# Patient Record
Sex: FEMALE | Race: WHITE | NOT HISPANIC OR LATINO | Employment: FULL TIME | URBAN - METROPOLITAN AREA
[De-identification: names, ages, dates, MRNs, and addresses within clinical notes are randomized per-mention and may not be internally consistent; named-entity substitution may affect disease eponyms.]

---

## 2019-11-04 ENCOUNTER — HOSPITAL ENCOUNTER (EMERGENCY)
Facility: HOSPITAL | Age: 44
Discharge: HOME | End: 2019-11-04
Attending: EMERGENCY MEDICINE | Admitting: EMERGENCY MEDICINE

## 2019-11-04 ENCOUNTER — APPOINTMENT (OUTPATIENT)
Dept: CT IMAGING | Facility: HOSPITAL | Age: 44
End: 2019-11-04

## 2019-11-04 VITALS
HEART RATE: 98 BPM | WEIGHT: 215 LBS | BODY MASS INDEX: 36.7 KG/M2 | OXYGEN SATURATION: 100 % | HEIGHT: 64 IN | SYSTOLIC BLOOD PRESSURE: 158 MMHG | DIASTOLIC BLOOD PRESSURE: 88 MMHG | TEMPERATURE: 98.6 F | RESPIRATION RATE: 16 BRPM

## 2019-11-04 DIAGNOSIS — M51.36 DEGENERATIVE DISC DISEASE, LUMBAR: ICD-10-CM

## 2019-11-04 DIAGNOSIS — M53.86 SCIATICA ASSOCIATED WITH DISORDER OF LUMBAR SPINE: ICD-10-CM

## 2019-11-04 DIAGNOSIS — M54.16 LUMBAR RADICULOPATHY: Primary | ICD-10-CM

## 2019-11-04 PROCEDURE — 63710000001 PREDNISONE PER 1 MG: Performed by: PHYSICIAN ASSISTANT

## 2019-11-04 PROCEDURE — 99283 EMERGENCY DEPT VISIT LOW MDM: CPT

## 2019-11-04 PROCEDURE — 72131 CT LUMBAR SPINE W/O DYE: CPT

## 2019-11-04 RX ORDER — HYDROCODONE BITARTRATE AND ACETAMINOPHEN 7.5; 325 MG/1; MG/1
1 TABLET ORAL EVERY 6 HOURS PRN
Qty: 12 TABLET | Refills: 0 | Status: SHIPPED | OUTPATIENT
Start: 2019-11-04 | End: 2019-11-13

## 2019-11-04 RX ORDER — LIDOCAINE 50 MG/G
1 PATCH TOPICAL EVERY 24 HOURS
Qty: 30 PATCH | Refills: 0 | Status: SHIPPED | OUTPATIENT
Start: 2019-11-04

## 2019-11-04 RX ORDER — ORPHENADRINE CITRATE 100 MG/1
100 TABLET, EXTENDED RELEASE ORAL 2 TIMES DAILY
Qty: 14 TABLET | Refills: 0 | Status: SHIPPED | OUTPATIENT
Start: 2019-11-04 | End: 2019-11-13

## 2019-11-04 RX ORDER — GABAPENTIN 600 MG/1
600 TABLET ORAL 3 TIMES DAILY PRN
COMMUNITY

## 2019-11-04 RX ORDER — PREDNISONE 20 MG/1
60 TABLET ORAL ONCE
Status: COMPLETED | OUTPATIENT
Start: 2019-11-04 | End: 2019-11-04

## 2019-11-04 RX ORDER — ORPHENADRINE CITRATE 100 MG/1
100 TABLET, EXTENDED RELEASE ORAL EVERY 12 HOURS SCHEDULED
Status: DISCONTINUED | OUTPATIENT
Start: 2019-11-04 | End: 2019-11-04 | Stop reason: HOSPADM

## 2019-11-04 RX ORDER — PREDNISONE 20 MG/1
20 TABLET ORAL 2 TIMES DAILY
Qty: 10 TABLET | Refills: 0 | Status: SHIPPED | OUTPATIENT
Start: 2019-11-04 | End: 2019-11-09

## 2019-11-04 RX ORDER — HYDROCODONE BITARTRATE AND ACETAMINOPHEN 7.5; 325 MG/1; MG/1
1 TABLET ORAL ONCE
Status: COMPLETED | OUTPATIENT
Start: 2019-11-04 | End: 2019-11-04

## 2019-11-04 RX ADMIN — ORPHENADRINE CITRATE 100 MG: 100 TABLET, EXTENDED RELEASE ORAL at 12:41

## 2019-11-04 RX ADMIN — HYDROCODONE BITARTRATE AND ACETAMINOPHEN 1 TABLET: 7.5; 325 TABLET ORAL at 12:23

## 2019-11-04 RX ADMIN — PREDNISONE 60 MG: 20 TABLET ORAL at 12:23

## 2019-11-13 ENCOUNTER — OFFICE VISIT (OUTPATIENT)
Dept: NEUROSURGERY | Facility: CLINIC | Age: 44
End: 2019-11-13

## 2019-11-13 VITALS
WEIGHT: 211 LBS | SYSTOLIC BLOOD PRESSURE: 142 MMHG | BODY MASS INDEX: 36.02 KG/M2 | TEMPERATURE: 99 F | DIASTOLIC BLOOD PRESSURE: 100 MMHG | HEIGHT: 64 IN

## 2019-11-13 DIAGNOSIS — M54.41 ACUTE RIGHT-SIDED LOW BACK PAIN WITH RIGHT-SIDED SCIATICA: Primary | ICD-10-CM

## 2019-11-13 PROCEDURE — 99213 OFFICE O/P EST LOW 20 MIN: CPT | Performed by: PHYSICIAN ASSISTANT

## 2019-11-13 RX ORDER — LISDEXAMFETAMINE DIMESYLATE 70 MG/1
CAPSULE ORAL
COMMUNITY
Start: 2019-11-12

## 2019-11-13 RX ORDER — MELOXICAM 15 MG/1
15 TABLET ORAL DAILY
Qty: 30 TABLET | Refills: 1 | Status: SHIPPED | OUTPATIENT
Start: 2019-11-13

## 2020-01-31 ENCOUNTER — TELEPHONE (OUTPATIENT)
Dept: NEUROSURGERY | Facility: CLINIC | Age: 45
End: 2020-01-31

## 2024-01-25 ENCOUNTER — OFFICE VISIT (OUTPATIENT)
Dept: ORTHOPEDIC SURGERY | Facility: CLINIC | Age: 49
End: 2024-01-25
Payer: MEDICAID

## 2024-01-25 VITALS
SYSTOLIC BLOOD PRESSURE: 130 MMHG | HEIGHT: 64 IN | WEIGHT: 241 LBS | DIASTOLIC BLOOD PRESSURE: 80 MMHG | BODY MASS INDEX: 41.15 KG/M2

## 2024-01-25 DIAGNOSIS — M17.12 PRIMARY OSTEOARTHRITIS OF LEFT KNEE: Primary | ICD-10-CM

## 2024-01-25 DIAGNOSIS — M25.562 LEFT KNEE PAIN, UNSPECIFIED CHRONICITY: ICD-10-CM

## 2024-01-25 DIAGNOSIS — M25.462 EFFUSION OF LEFT KNEE: ICD-10-CM

## 2024-01-25 PROBLEM — M54.42 CHRONIC BILATERAL LOW BACK PAIN WITH BILATERAL SCIATICA: Status: ACTIVE | Noted: 2021-06-14

## 2024-01-25 PROBLEM — I10 HTN (HYPERTENSION): Status: ACTIVE | Noted: 2019-12-30

## 2024-01-25 PROBLEM — M16.12 PRIMARY OSTEOARTHRITIS OF LEFT HIP: Status: ACTIVE | Noted: 2020-01-14

## 2024-01-25 PROBLEM — G89.29 CHRONIC BILATERAL LOW BACK PAIN WITH BILATERAL SCIATICA: Status: ACTIVE | Noted: 2021-06-14

## 2024-01-25 PROBLEM — M54.41 CHRONIC BILATERAL LOW BACK PAIN WITH BILATERAL SCIATICA: Status: ACTIVE | Noted: 2021-06-14

## 2024-01-25 PROBLEM — E78.5 HYPERLIPIDEMIA: Status: ACTIVE | Noted: 2018-08-29

## 2024-01-25 PROBLEM — N93.9 ABNORMAL UTERINE BLEEDING (AUB): Status: ACTIVE | Noted: 2017-12-20

## 2024-01-25 PROBLEM — E66.9 OBESITY: Status: ACTIVE | Noted: 2017-02-15

## 2024-01-25 PROBLEM — F17.210 NICOTINE DEPENDENCE, CIGARETTES, UNCOMPLICATED: Status: ACTIVE | Noted: 2021-07-23

## 2024-01-25 PROBLEM — M54.50 LOW BACK PAIN: Status: ACTIVE | Noted: 2020-02-06

## 2024-01-25 RX ORDER — TRIAMCINOLONE ACETONIDE 40 MG/ML
2 INJECTION, SUSPENSION INTRA-ARTICULAR; INTRAMUSCULAR
Status: COMPLETED | OUTPATIENT
Start: 2024-01-25 | End: 2024-01-25

## 2024-01-25 RX ORDER — LAMOTRIGINE 100 MG/1
TABLET ORAL
COMMUNITY
Start: 2024-01-11

## 2024-01-25 RX ORDER — BUPIVACAINE HYDROCHLORIDE 5 MG/ML
1 INJECTION, SOLUTION EPIDURAL; INTRACAUDAL
Status: COMPLETED | OUTPATIENT
Start: 2024-01-25 | End: 2024-01-25

## 2024-01-25 RX ORDER — LIDOCAINE HYDROCHLORIDE 10 MG/ML
3 INJECTION, SOLUTION EPIDURAL; INFILTRATION; INTRACAUDAL; PERINEURAL
Status: COMPLETED | OUTPATIENT
Start: 2024-01-25 | End: 2024-01-25

## 2024-01-25 RX ORDER — CARIPRAZINE 1.5 MG/1
CAPSULE, GELATIN COATED ORAL
COMMUNITY
Start: 2024-01-09

## 2024-01-25 RX ORDER — SERTRALINE HYDROCHLORIDE 100 MG/1
TABLET, FILM COATED ORAL
COMMUNITY
Start: 2023-12-30

## 2024-01-25 RX ADMIN — TRIAMCINOLONE ACETONIDE 2 MG: 40 INJECTION, SUSPENSION INTRA-ARTICULAR; INTRAMUSCULAR at 09:39

## 2024-01-25 RX ADMIN — BUPIVACAINE HYDROCHLORIDE 1 ML: 5 INJECTION, SOLUTION EPIDURAL; INTRACAUDAL at 09:39

## 2024-01-25 RX ADMIN — LIDOCAINE HYDROCHLORIDE 3 ML: 10 INJECTION, SOLUTION EPIDURAL; INFILTRATION; INTRACAUDAL; PERINEURAL at 09:39

## 2024-08-28 ENCOUNTER — OFFICE VISIT (OUTPATIENT)
Dept: FAMILY MEDICINE CLINIC | Facility: CLINIC | Age: 49
End: 2024-08-28
Payer: COMMERCIAL

## 2024-08-28 VITALS
SYSTOLIC BLOOD PRESSURE: 122 MMHG | WEIGHT: 245.2 LBS | HEART RATE: 90 BPM | DIASTOLIC BLOOD PRESSURE: 84 MMHG | BODY MASS INDEX: 41.86 KG/M2 | OXYGEN SATURATION: 99 % | HEIGHT: 64 IN | TEMPERATURE: 98.7 F

## 2024-08-28 DIAGNOSIS — G47.39 SLEEP APNEA-LIKE BEHAVIOR: Primary | ICD-10-CM

## 2024-08-28 DIAGNOSIS — G89.29 CHRONIC MIDLINE LOW BACK PAIN WITHOUT SCIATICA: ICD-10-CM

## 2024-08-28 DIAGNOSIS — E53.8 B12 DEFICIENCY: ICD-10-CM

## 2024-08-28 DIAGNOSIS — M54.50 CHRONIC MIDLINE LOW BACK PAIN WITHOUT SCIATICA: ICD-10-CM

## 2024-08-28 DIAGNOSIS — Z00.00 GENERAL MEDICAL EXAM: ICD-10-CM

## 2024-08-28 DIAGNOSIS — Z12.11 COLON CANCER SCREENING: ICD-10-CM

## 2024-08-28 DIAGNOSIS — Z12.31 BREAST CANCER SCREENING BY MAMMOGRAM: ICD-10-CM

## 2024-08-28 PROCEDURE — 99386 PREV VISIT NEW AGE 40-64: CPT | Performed by: PHYSICIAN ASSISTANT

## 2024-08-30 ENCOUNTER — PATIENT ROUNDING (BHMG ONLY) (OUTPATIENT)
Dept: FAMILY MEDICINE CLINIC | Facility: CLINIC | Age: 49
End: 2024-08-30
Payer: COMMERCIAL

## 2024-08-30 RX ORDER — ATORVASTATIN CALCIUM 40 MG/1
40 TABLET, FILM COATED ORAL DAILY
Qty: 90 TABLET | Refills: 3 | Status: SHIPPED | OUTPATIENT
Start: 2024-08-30

## 2024-10-02 ENCOUNTER — OFFICE VISIT (OUTPATIENT)
Dept: PAIN MEDICINE | Facility: CLINIC | Age: 49
End: 2024-10-02
Payer: COMMERCIAL

## 2024-10-02 VITALS — WEIGHT: 244 LBS | HEIGHT: 64 IN | BODY MASS INDEX: 41.66 KG/M2

## 2024-10-02 DIAGNOSIS — M47.817 LUMBOSACRAL SPONDYLOSIS WITHOUT MYELOPATHY: Primary | ICD-10-CM

## 2024-10-02 PROCEDURE — 99203 OFFICE O/P NEW LOW 30 MIN: CPT | Performed by: STUDENT IN AN ORGANIZED HEALTH CARE EDUCATION/TRAINING PROGRAM

## 2024-10-02 RX ORDER — GABAPENTIN 100 MG/1
100 CAPSULE ORAL 2 TIMES DAILY
Qty: 60 CAPSULE | Refills: 0 | Status: SHIPPED | OUTPATIENT
Start: 2024-10-02

## 2024-10-05 ENCOUNTER — LAB (OUTPATIENT)
Dept: LAB | Facility: HOSPITAL | Age: 49
End: 2024-10-05
Payer: COMMERCIAL

## 2024-10-05 DIAGNOSIS — E53.8 B12 DEFICIENCY: ICD-10-CM

## 2024-10-05 DIAGNOSIS — Z00.00 GENERAL MEDICAL EXAM: ICD-10-CM

## 2024-10-05 LAB
ALBUMIN SERPL-MCNC: 4 G/DL (ref 3.5–5.2)
ALBUMIN/GLOB SERPL: 1.5 G/DL
ALP SERPL-CCNC: 103 U/L (ref 39–117)
ALT SERPL W P-5'-P-CCNC: 16 U/L (ref 1–33)
ANION GAP SERPL CALCULATED.3IONS-SCNC: 11.8 MMOL/L (ref 5–15)
AST SERPL-CCNC: 16 U/L (ref 1–32)
BILIRUB SERPL-MCNC: 0.5 MG/DL (ref 0–1.2)
BUN SERPL-MCNC: 16 MG/DL (ref 6–20)
BUN/CREAT SERPL: 13.1 (ref 7–25)
CALCIUM SPEC-SCNC: 9.3 MG/DL (ref 8.6–10.5)
CHLORIDE SERPL-SCNC: 104 MMOL/L (ref 98–107)
CHOLEST SERPL-MCNC: 108 MG/DL (ref 0–200)
CO2 SERPL-SCNC: 25.2 MMOL/L (ref 22–29)
CREAT SERPL-MCNC: 1.22 MG/DL (ref 0.57–1)
DEPRECATED RDW RBC AUTO: 46.6 FL (ref 37–54)
EGFRCR SERPLBLD CKD-EPI 2021: 54.5 ML/MIN/1.73
ERYTHROCYTE [DISTWIDTH] IN BLOOD BY AUTOMATED COUNT: 13.2 % (ref 12.3–15.4)
FOLATE SERPL-MCNC: 6.09 NG/ML (ref 4.78–24.2)
GLOBULIN UR ELPH-MCNC: 2.7 GM/DL
GLUCOSE SERPL-MCNC: 98 MG/DL (ref 65–99)
HCT VFR BLD AUTO: 50.4 % (ref 34–46.6)
HCV AB SER QL: NORMAL
HDLC SERPL-MCNC: 40 MG/DL (ref 40–60)
HGB BLD-MCNC: 15.8 G/DL (ref 12–15.9)
LDLC SERPL CALC-MCNC: 52 MG/DL (ref 0–100)
LDLC/HDLC SERPL: 1.32 {RATIO}
MCH RBC QN AUTO: 29.6 PG (ref 26.6–33)
MCHC RBC AUTO-ENTMCNC: 31.3 G/DL (ref 31.5–35.7)
MCV RBC AUTO: 94.4 FL (ref 79–97)
PLATELET # BLD AUTO: 282 10*3/MM3 (ref 140–450)
PMV BLD AUTO: 12.3 FL (ref 6–12)
POTASSIUM SERPL-SCNC: 4.5 MMOL/L (ref 3.5–5.2)
PROT SERPL-MCNC: 6.7 G/DL (ref 6–8.5)
RBC # BLD AUTO: 5.34 10*6/MM3 (ref 3.77–5.28)
SODIUM SERPL-SCNC: 141 MMOL/L (ref 136–145)
TRIGL SERPL-MCNC: 76 MG/DL (ref 0–150)
TSH SERPL DL<=0.05 MIU/L-ACNC: 0.89 UIU/ML (ref 0.27–4.2)
VIT B12 BLD-MCNC: 255 PG/ML (ref 211–946)
VLDLC SERPL-MCNC: 16 MG/DL (ref 5–40)
WBC NRBC COR # BLD AUTO: 7.31 10*3/MM3 (ref 3.4–10.8)

## 2024-10-05 PROCEDURE — 86803 HEPATITIS C AB TEST: CPT

## 2024-10-05 PROCEDURE — 82607 VITAMIN B-12: CPT

## 2024-10-05 PROCEDURE — 82746 ASSAY OF FOLIC ACID SERUM: CPT

## 2024-10-05 PROCEDURE — 80061 LIPID PANEL: CPT

## 2024-10-05 PROCEDURE — 36415 COLL VENOUS BLD VENIPUNCTURE: CPT

## 2024-10-05 PROCEDURE — 80050 GENERAL HEALTH PANEL: CPT

## 2024-10-07 RX ORDER — FOLIC ACID 1 MG/1
1 TABLET ORAL DAILY
Qty: 30 TABLET | Refills: 5 | Status: SHIPPED | OUTPATIENT
Start: 2024-10-07

## 2024-10-07 RX ORDER — NEEDLES, SAFETY 18GX1 1/2"
1 NEEDLE, DISPOSABLE MISCELLANEOUS WEEKLY
Qty: 8 EACH | Refills: 3 | Status: SHIPPED | OUTPATIENT
Start: 2024-10-07

## 2024-10-07 RX ORDER — CYANOCOBALAMIN 1000 UG/ML
1000 INJECTION, SOLUTION INTRAMUSCULAR; SUBCUTANEOUS WEEKLY
Qty: 4 ML | Refills: 3 | Status: SHIPPED | OUTPATIENT
Start: 2024-10-07

## 2024-10-11 RX ORDER — MELOXICAM 15 MG/1
15 TABLET ORAL DAILY
Qty: 90 TABLET | Refills: 1 | Status: SHIPPED | OUTPATIENT
Start: 2024-10-11

## 2024-11-22 ENCOUNTER — OFFICE VISIT (OUTPATIENT)
Dept: SLEEP MEDICINE | Facility: CLINIC | Age: 49
End: 2024-11-22
Payer: COMMERCIAL

## 2024-11-22 VITALS
TEMPERATURE: 98.2 F | BODY MASS INDEX: 40.8 KG/M2 | HEART RATE: 126 BPM | WEIGHT: 239 LBS | SYSTOLIC BLOOD PRESSURE: 138 MMHG | HEIGHT: 64 IN | OXYGEN SATURATION: 97 % | DIASTOLIC BLOOD PRESSURE: 84 MMHG

## 2024-11-22 DIAGNOSIS — I10 PRIMARY HYPERTENSION: Primary | ICD-10-CM

## 2024-11-22 DIAGNOSIS — E66.01 CLASS 3 SEVERE OBESITY DUE TO EXCESS CALORIES WITH BODY MASS INDEX (BMI) OF 50.0 TO 59.9 IN ADULT, UNSPECIFIED WHETHER SERIOUS COMORBIDITY PRESENT: ICD-10-CM

## 2024-11-22 DIAGNOSIS — E66.813 CLASS 3 SEVERE OBESITY DUE TO EXCESS CALORIES WITH BODY MASS INDEX (BMI) OF 50.0 TO 59.9 IN ADULT, UNSPECIFIED WHETHER SERIOUS COMORBIDITY PRESENT: ICD-10-CM

## 2024-11-22 DIAGNOSIS — R06.83 SNORING: ICD-10-CM

## 2024-11-22 DIAGNOSIS — G47.19 EXCESSIVE DAYTIME SLEEPINESS: ICD-10-CM

## 2024-11-22 NOTE — PROGRESS NOTES
"Chief Complaint:   Chief Complaint   Patient presents with    Sleeping Problem    Snoring       HPI:    Hanane Torres is a 48 y.o. female here to establish care.  Patient sees Ms. Blues ISBELL for primary care.  Patient has a 1 to 2-year history of snoring, excessive daytime sleepiness teeth grinding and leg cramps patient states this has been worsening lately..  Patient states \"I am sleeping too much and always feel tired.  No history of nasal fracture or head injury.  She is here today to see if sleep apnea is playing a part in any previous diagnoses or current symptoms.    Patient goes to bed the same time weekend and weekday usually 6 PM to 9 PM and getting up at 6 AM throughout the week.  She gets 10 to 12 hours of sleep nightly and is not rested upon awakening.  She goes to sleep quickly and is up and down at least 3-4 times at night.  She does take a daily 2 to 3-hour nap.    Sleep apnea risk factors:  Patient has symptoms Snoring, Restless sleep, Daytime sleepiness, and Frequent arousal from sleep  suggestive of the presence of obstructive sleep apnea. The patient has the following co-morbid conditions of HTN and morbidly obese (BMI > 40 or > 35 with obesity - related health condition). We did speak today about the consequences of untreated sleep apnea worsening the previously listed co-morbidities and sudden cardiac death.  We also discussed different therapies available to him such as CPAP, MAD, or ENT referral.  Patient verbalizes understanding.    Social history  This very pleasant 48-year-old female.  Patient is a medical assistant for [employer].  Patient is a non-smoker and nondrinker.  Patient will have 1 cup of regular coffee daily and 2 sodas.       Family History   Problem Relation Age of Onset    Kidney disease Mother     Diabetes Mother     Arthritis Mother     Obesity Mother     COPD Mother     Hyperlipidemia Father     Obesity Sister     Diabetes Sister     Arthritis Sister     Hypertension " "Sister      Past Surgical History:   Procedure Laterality Date    CARPAL TUNNEL RELEASE       SECTION      CHOLECYSTECTOMY      GALLBLADDER SURGERY      HIP ARTHROPLASTY Right 2020    JOINT REPLACEMENT  [id number]    JUAN JOSE right    ORTHOPEDIC SURGERY               Current medications are:   Current Outpatient Medications:     amphetamine-dextroamphetamine XR (Adderall XR) 20 MG 24 hr capsule, Take 1 capsule by mouth 2 (Two) Times a Day, Disp: 60 capsule, Rfl: 0    atorvastatin (Lipitor) 40 MG tablet, Take 1 tablet by mouth Daily., Disp: 90 tablet, Rfl: 3    Cariprazine HCl (Vraylar) 4.5 MG capsule capsule, Take 1 capsule by mouth Daily., Disp: 30 capsule, Rfl: 0    FLUoxetine (PROzac) 40 MG capsule, Take 1 capsule by mouth Daily., Disp: 30 capsule, Rfl: 0    folic acid (FOLVITE) 1 MG tablet, Take 1 tablet by mouth Daily., Disp: 30 tablet, Rfl: 5    lisinopril (PRINIVIL,ZESTRIL) 20 MG tablet, Take 1 tablet by mouth Daily., Disp: 30 tablet, Rfl: 11    meloxicam (MOBIC) 15 MG tablet, Take 1 tablet by mouth Daily with Food., Disp: 90 tablet, Rfl: 1    norethindrone (AYGESTIN) 5 MG tablet, Take 2 tablets by mouth Daily., Disp: 180 tablet, Rfl: 2    Syringe/Needle, Disp, (BD Eclipse Syringe) 25G X 1\" 3 ML misc, Use 1 syringe 1 (One) Time Per Week with B-12 injection., Disp: 8 each, Rfl: 3    cephalexin (KEFLEX) 500 MG capsule, Take 4 capsules by mouth 1 hour before dental appointment (Patient not taking: Reported on 2024), Disp: 4 capsule, Rfl: 10    cyanocobalamin 1000 MCG/ML injection, Inject 1 mL into the appropriate muscle as directed by prescriber 1 (One) Time Per Week for 8 weeks, then 1 ml per month thereafter (Patient not taking: Reported on 2024), Disp: 4 mL, Rfl: 3.      The patient's relevant past medical, surgical, family and social history were reviewed and updated in Epic as appropriate.       Review of Systems   Constitutional:  Positive for fatigue.   HENT:  Positive for drooling.    Eyes:  " "Positive for visual disturbance.   Musculoskeletal:  Positive for back pain, joint swelling, myalgias, neck pain and neck stiffness.   Allergic/Immunologic: Positive for environmental allergies.   Psychiatric/Behavioral:  Positive for agitation, decreased concentration, dysphoric mood and sleep disturbance. The patient is nervous/anxious.    All other systems reviewed and are negative.        Objective:    Physical Exam  Constitutional:       Appearance: Normal appearance.   HENT:      Head: Normocephalic and atraumatic.      Mouth/Throat:      Mouth: Mucous membranes are moist.      Pharynx: Oropharynx is clear.      Comments: Class 4 airway  Cardiovascular:      Rate and Rhythm: Normal rate and regular rhythm. Tachycardia present.   Pulmonary:      Effort: Pulmonary effort is normal.      Breath sounds: Normal breath sounds.   Skin:     General: Skin is warm and dry.   Neurological:      Mental Status: She is alert and oriented to person, place, and time.   Psychiatric:         Mood and Affect: Mood normal.         Behavior: Behavior normal.         Thought Content: Thought content normal.         Judgment: Judgment normal.         /84   Pulse (!) 126   Temp 98.2 °F (36.8 °C)   Ht 162.6 cm (64.02\")   Wt 108 kg (239 lb)   SpO2 97%   BMI 41.00 kg/m²     ASSESSMENT/PLAN    Diagnoses and all orders for this visit:    1. Primary hypertension (Primary)  -     Home Sleep Study; Future    2. Excessive daytime sleepiness  -     Home Sleep Study; Future    3. Snoring  -     Home Sleep Study; Future    4. Class 3 severe obesity due to excess calories with body mass index (BMI) of 50.0 to 59.9 in adult, unspecified whether serious comorbidity present  -     Home Sleep Study; Future      Certainly has a strong story for sleep apnea due to the consequences of untreated sleep apnea will move forward with HST and follow-up as appropriate.    Thank you for this kind referral.      Signed by  Hanane Trevizo, " APRN    November 22, 2024      CC: Keren Mackey, Keren Pierre PA-C

## 2025-01-24 ENCOUNTER — HOSPITAL ENCOUNTER (OUTPATIENT)
Dept: MAMMOGRAPHY | Facility: HOSPITAL | Age: 50
Discharge: HOME | End: 2025-01-24
Admitting: PHYSICIAN ASSISTANT
Payer: COMMERCIAL

## 2025-01-24 DIAGNOSIS — Z12.31 BREAST CANCER SCREENING BY MAMMOGRAM: ICD-10-CM

## 2025-01-24 PROCEDURE — 77063 BREAST TOMOSYNTHESIS BI: CPT

## 2025-01-24 PROCEDURE — 77067 SCR MAMMO BI INCL CAD: CPT

## 2025-02-03 ENCOUNTER — HOSPITAL ENCOUNTER (OUTPATIENT)
Dept: SLEEP MEDICINE | Facility: HOSPITAL | Age: 50
Discharge: HOME | End: 2025-02-03
Admitting: NURSE PRACTITIONER
Payer: COMMERCIAL

## 2025-02-03 VITALS — HEIGHT: 64 IN | BODY MASS INDEX: 40.65 KG/M2 | WEIGHT: 238.1 LBS

## 2025-02-03 DIAGNOSIS — E66.01 CLASS 3 SEVERE OBESITY DUE TO EXCESS CALORIES WITH BODY MASS INDEX (BMI) OF 50.0 TO 59.9 IN ADULT, UNSPECIFIED WHETHER SERIOUS COMORBIDITY PRESENT: ICD-10-CM

## 2025-02-03 DIAGNOSIS — E66.813 CLASS 3 SEVERE OBESITY DUE TO EXCESS CALORIES WITH BODY MASS INDEX (BMI) OF 50.0 TO 59.9 IN ADULT, UNSPECIFIED WHETHER SERIOUS COMORBIDITY PRESENT: ICD-10-CM

## 2025-02-03 DIAGNOSIS — R06.83 SNORING: ICD-10-CM

## 2025-02-03 DIAGNOSIS — I10 PRIMARY HYPERTENSION: ICD-10-CM

## 2025-02-03 DIAGNOSIS — G47.19 EXCESSIVE DAYTIME SLEEPINESS: ICD-10-CM

## 2025-02-03 PROCEDURE — G0399 HOME SLEEP TEST/TYPE 3 PORTA: HCPCS

## 2025-02-05 ENCOUNTER — TRANSCRIBE ORDERS (OUTPATIENT)
Dept: MAMMOGRAPHY | Facility: HOSPITAL | Age: 50
End: 2025-02-05
Payer: COMMERCIAL

## 2025-02-05 ENCOUNTER — HOSPITAL ENCOUNTER (OUTPATIENT)
Dept: MAMMOGRAPHY | Facility: HOSPITAL | Age: 50
Discharge: HOME | End: 2025-02-05
Admitting: RADIOLOGY
Payer: COMMERCIAL

## 2025-02-05 DIAGNOSIS — R92.8 ABNORMAL MAMMOGRAM: Primary | ICD-10-CM

## 2025-02-05 DIAGNOSIS — R92.8 ABNORMAL MAMMOGRAM: ICD-10-CM

## 2025-02-05 PROCEDURE — 77061 BREAST TOMOSYNTHESIS UNI: CPT | Performed by: RADIOLOGY

## 2025-02-05 PROCEDURE — G0279 TOMOSYNTHESIS, MAMMO: HCPCS

## 2025-02-05 PROCEDURE — 77065 DX MAMMO INCL CAD UNI: CPT

## 2025-02-05 PROCEDURE — 77065 DX MAMMO INCL CAD UNI: CPT | Performed by: RADIOLOGY

## 2025-02-06 DIAGNOSIS — I10 ESSENTIAL HYPERTENSION: Primary | ICD-10-CM

## 2025-02-06 DIAGNOSIS — E66.813 CLASS 3 SEVERE OBESITY DUE TO EXCESS CALORIES WITHOUT SERIOUS COMORBIDITY WITH BODY MASS INDEX (BMI) OF 50.0 TO 59.9 IN ADULT: ICD-10-CM

## 2025-02-06 DIAGNOSIS — G47.19 EXCESSIVE DAYTIME SLEEPINESS: ICD-10-CM

## 2025-02-06 DIAGNOSIS — E66.01 CLASS 3 SEVERE OBESITY DUE TO EXCESS CALORIES WITHOUT SERIOUS COMORBIDITY WITH BODY MASS INDEX (BMI) OF 50.0 TO 59.9 IN ADULT: ICD-10-CM

## 2025-02-06 DIAGNOSIS — R06.83 SNORING: ICD-10-CM

## 2025-02-12 RX ORDER — LISINOPRIL 20 MG/1
20 TABLET ORAL DAILY
Qty: 30 TABLET | Refills: 6 | Status: SHIPPED | OUTPATIENT
Start: 2025-02-12

## 2025-02-12 NOTE — TELEPHONE ENCOUNTER
I am out of refills on my lisinipril. Will you please send a prescription to [facility].      Thank you  Rhea

## 2025-02-13 ENCOUNTER — HOSPITAL ENCOUNTER (OUTPATIENT)
Dept: MAMMOGRAPHY | Facility: HOSPITAL | Age: 50
Discharge: HOME | End: 2025-02-13
Payer: COMMERCIAL

## 2025-02-13 DIAGNOSIS — R92.8 ABNORMAL MAMMOGRAM: ICD-10-CM

## 2025-02-13 PROCEDURE — 25010000002 LIDOCAINE 1 % SOLUTION: Performed by: RADIOLOGY

## 2025-02-13 PROCEDURE — C1819 TISSUE LOCALIZATION-EXCISION: HCPCS

## 2025-02-13 PROCEDURE — 25010000002 LIDOCAINE 1% - EPINEPHRINE 1:100000 1 %-1:100000 SOLUTION: Performed by: RADIOLOGY

## 2025-02-13 RX ORDER — LIDOCAINE HYDROCHLORIDE 10 MG/ML
5 INJECTION, SOLUTION INFILTRATION; PERINEURAL ONCE
Status: COMPLETED | OUTPATIENT
Start: 2025-02-13 | End: 2025-02-13

## 2025-02-13 RX ORDER — LIDOCAINE HYDROCHLORIDE AND EPINEPHRINE 10; 10 MG/ML; UG/ML
10 INJECTION, SOLUTION INFILTRATION; PERINEURAL ONCE
Status: COMPLETED | OUTPATIENT
Start: 2025-02-13 | End: 2025-02-13

## 2025-02-13 RX ADMIN — LIDOCAINE HYDROCHLORIDE,EPINEPHRINE BITARTRATE 10 ML: 10; .01 INJECTION, SOLUTION INFILTRATION; PERINEURAL at 14:23

## 2025-02-13 RX ADMIN — Medication 5 ML: at 14:23

## 2025-02-17 ENCOUNTER — TELEPHONE (OUTPATIENT)
Dept: MAMMOGRAPHY | Facility: HOSPITAL | Age: 50
End: 2025-02-17
Payer: COMMERCIAL

## 2025-02-17 ENCOUNTER — TRANSCRIBE ORDERS (OUTPATIENT)
Dept: ADMINISTRATIVE | Facility: HOSPITAL | Age: 50
End: 2025-02-17
Payer: COMMERCIAL

## 2025-02-17 DIAGNOSIS — R92.8 ABNORMAL MAMMOGRAM: Primary | ICD-10-CM

## 2025-02-17 LAB
CYTO UR: NORMAL
LAB AP CASE REPORT: NORMAL
LAB AP CLINICAL INFORMATION: NORMAL
LAB AP DIAGNOSIS COMMENT: NORMAL
PATH REPORT.FINAL DX SPEC: NORMAL
PATH REPORT.GROSS SPEC: NORMAL

## 2025-02-18 ENCOUNTER — TELEPHONE (OUTPATIENT)
Dept: MAMMOGRAPHY | Facility: HOSPITAL | Age: 50
End: 2025-02-18
Payer: COMMERCIAL

## 2025-02-18 NOTE — TELEPHONE ENCOUNTER
Late entry    On 2/17/25 at 14:08, patient notified of pathology results and recommendation. Verbalizes understanding. States having some discomfort, using analgesics with relief. Denies signs and symptoms of infection. Questions answered, support given, verbalized understanding. Pt aware she will be contacted to schedule follow up.

## 2025-02-26 ENCOUNTER — LAB (OUTPATIENT)
Facility: HOSPITAL | Age: 50
End: 2025-02-26
Payer: COMMERCIAL

## 2025-02-26 ENCOUNTER — TRANSCRIBE ORDERS (OUTPATIENT)
Facility: HOSPITAL | Age: 50
End: 2025-02-26
Payer: COMMERCIAL

## 2025-02-26 DIAGNOSIS — F41.1 GENERALIZED ANXIETY DISORDER: ICD-10-CM

## 2025-02-26 DIAGNOSIS — F31.9 BIPOLAR AFFECTIVE DISORDER, REMISSION STATUS UNSPECIFIED: ICD-10-CM

## 2025-02-26 DIAGNOSIS — F90.0 ADHD, PREDOMINANTLY INATTENTIVE TYPE: Primary | ICD-10-CM

## 2025-02-26 DIAGNOSIS — F90.0 ADHD, PREDOMINANTLY INATTENTIVE TYPE: ICD-10-CM

## 2025-02-26 DIAGNOSIS — F39 MILD MOOD DISORDER: ICD-10-CM

## 2025-02-26 LAB
25(OH)D3 SERPL-MCNC: 18.2 NG/ML (ref 30–100)
AMPHET+METHAMPHET UR QL: POSITIVE
AMPHETAMINES UR QL: NEGATIVE
BARBITURATES UR QL SCN: NEGATIVE
BASOPHILS # BLD AUTO: 0.01 10*3/MM3 (ref 0–0.2)
BASOPHILS NFR BLD AUTO: 0.1 % (ref 0–1.5)
BENZODIAZ UR QL SCN: POSITIVE
BUPRENORPHINE SERPL-MCNC: NEGATIVE NG/ML
CANNABINOIDS SERPL QL: NEGATIVE
COCAINE UR QL: NEGATIVE
DEPRECATED RDW RBC AUTO: 43 FL (ref 37–54)
EOSINOPHIL # BLD AUTO: 0.15 10*3/MM3 (ref 0–0.4)
EOSINOPHIL NFR BLD AUTO: 1.7 % (ref 0.3–6.2)
ERYTHROCYTE [DISTWIDTH] IN BLOOD BY AUTOMATED COUNT: 13.2 % (ref 12.3–15.4)
FENTANYL UR-MCNC: NEGATIVE NG/ML
HCT VFR BLD AUTO: 49.2 % (ref 34–46.6)
HGB BLD-MCNC: 16.5 G/DL (ref 12–15.9)
IMM GRANULOCYTES # BLD AUTO: 0.02 10*3/MM3 (ref 0–0.05)
IMM GRANULOCYTES NFR BLD AUTO: 0.2 % (ref 0–0.5)
LYMPHOCYTES # BLD AUTO: 2.05 10*3/MM3 (ref 0.7–3.1)
LYMPHOCYTES NFR BLD AUTO: 23 % (ref 19.6–45.3)
MCH RBC QN AUTO: 29.4 PG (ref 26.6–33)
MCHC RBC AUTO-ENTMCNC: 33.5 G/DL (ref 31.5–35.7)
MCV RBC AUTO: 87.5 FL (ref 79–97)
METHADONE UR QL SCN: NEGATIVE
MONOCYTES # BLD AUTO: 0.81 10*3/MM3 (ref 0.1–0.9)
MONOCYTES NFR BLD AUTO: 9.1 % (ref 5–12)
NEUTROPHILS NFR BLD AUTO: 5.88 10*3/MM3 (ref 1.7–7)
NEUTROPHILS NFR BLD AUTO: 65.9 % (ref 42.7–76)
OPIATES UR QL: NEGATIVE
OXYCODONE UR QL SCN: NEGATIVE
PCP UR QL SCN: NEGATIVE
PLATELET # BLD AUTO: 233 10*3/MM3 (ref 140–450)
PMV BLD AUTO: 12.1 FL (ref 6–12)
RBC # BLD AUTO: 5.62 10*6/MM3 (ref 3.77–5.28)
TRICYCLICS UR QL SCN: NEGATIVE
WBC NRBC COR # BLD AUTO: 8.92 10*3/MM3 (ref 3.4–10.8)

## 2025-02-26 PROCEDURE — 80050 GENERAL HEALTH PANEL: CPT

## 2025-02-26 PROCEDURE — 80061 LIPID PANEL: CPT

## 2025-02-26 PROCEDURE — 82306 VITAMIN D 25 HYDROXY: CPT

## 2025-02-26 PROCEDURE — 36415 COLL VENOUS BLD VENIPUNCTURE: CPT

## 2025-02-26 PROCEDURE — 80307 DRUG TEST PRSMV CHEM ANLYZR: CPT

## 2025-02-27 LAB
ALBUMIN SERPL-MCNC: 4.1 G/DL (ref 3.5–5.2)
ALBUMIN/GLOB SERPL: 1.5 G/DL
ALP SERPL-CCNC: 115 U/L (ref 39–117)
ALT SERPL W P-5'-P-CCNC: 30 U/L (ref 1–33)
ANION GAP SERPL CALCULATED.3IONS-SCNC: 13 MMOL/L (ref 5–15)
AST SERPL-CCNC: 30 U/L (ref 1–32)
BILIRUB SERPL-MCNC: 1.2 MG/DL (ref 0–1.2)
BUN SERPL-MCNC: 19 MG/DL (ref 6–20)
BUN/CREAT SERPL: 18.1 (ref 7–25)
CALCIUM SPEC-SCNC: 9.4 MG/DL (ref 8.6–10.5)
CHLORIDE SERPL-SCNC: 100 MMOL/L (ref 98–107)
CHOLEST SERPL-MCNC: 119 MG/DL (ref 0–200)
CO2 SERPL-SCNC: 28 MMOL/L (ref 22–29)
CREAT SERPL-MCNC: 1.05 MG/DL (ref 0.57–1)
EGFRCR SERPLBLD CKD-EPI 2021: 64.9 ML/MIN/1.73
GLOBULIN UR ELPH-MCNC: 2.8 GM/DL
GLUCOSE SERPL-MCNC: 89 MG/DL (ref 65–99)
HDLC SERPL-MCNC: 38 MG/DL (ref 40–60)
LDLC SERPL CALC-MCNC: 61 MG/DL (ref 0–100)
LDLC/HDLC SERPL: 1.57 {RATIO}
POTASSIUM SERPL-SCNC: 3.4 MMOL/L (ref 3.5–5.2)
PROT SERPL-MCNC: 6.9 G/DL (ref 6–8.5)
SODIUM SERPL-SCNC: 141 MMOL/L (ref 136–145)
TRIGL SERPL-MCNC: 106 MG/DL (ref 0–150)
VLDLC SERPL-MCNC: 20 MG/DL (ref 5–40)

## 2025-02-28 LAB — TSH SERPL DL<=0.005 MIU/L-ACNC: 2.08 UIU/ML (ref 0.45–4.5)

## 2025-04-02 ENCOUNTER — HOSPITAL ENCOUNTER (OUTPATIENT)
Dept: MAMMOGRAPHY | Facility: HOSPITAL | Age: 50
Discharge: HOME | End: 2025-04-02
Payer: COMMERCIAL

## 2025-04-02 DIAGNOSIS — R92.8 ABNORMAL MAMMOGRAM: ICD-10-CM

## 2025-04-02 PROCEDURE — 88305 TISSUE EXAM BY PATHOLOGIST: CPT | Performed by: PHYSICIAN ASSISTANT

## 2025-04-02 PROCEDURE — 88341 IMHCHEM/IMCYTCHM EA ADD ANTB: CPT | Performed by: PHYSICIAN ASSISTANT

## 2025-04-02 PROCEDURE — 88360 TUMOR IMMUNOHISTOCHEM/MANUAL: CPT | Performed by: PHYSICIAN ASSISTANT

## 2025-04-02 PROCEDURE — C1819 TISSUE LOCALIZATION-EXCISION: HCPCS

## 2025-04-02 PROCEDURE — 25010000002 LIDOCAINE 1% - EPINEPHRINE 1:100000 1 %-1:100000 SOLUTION: Performed by: PHYSICIAN ASSISTANT

## 2025-04-02 PROCEDURE — 25010000002 LIDOCAINE 1 % SOLUTION: Performed by: PHYSICIAN ASSISTANT

## 2025-04-02 RX ORDER — LIDOCAINE HYDROCHLORIDE AND EPINEPHRINE 10; 10 MG/ML; UG/ML
10 INJECTION, SOLUTION INFILTRATION; PERINEURAL ONCE
Status: COMPLETED | OUTPATIENT
Start: 2025-04-02 | End: 2025-04-02

## 2025-04-02 RX ORDER — LIDOCAINE HYDROCHLORIDE 10 MG/ML
5 INJECTION, SOLUTION INFILTRATION; PERINEURAL ONCE
Status: COMPLETED | OUTPATIENT
Start: 2025-04-02 | End: 2025-04-02

## 2025-04-02 RX ADMIN — Medication 5 ML: at 11:39

## 2025-04-02 RX ADMIN — LIDOCAINE HYDROCHLORIDE,EPINEPHRINE BITARTRATE 10 ML: 10; .01 INJECTION, SOLUTION INFILTRATION; PERINEURAL at 11:39

## 2025-04-02 NOTE — PROGRESS NOTES
Alert and oriented. Denies discomfort, no active bleeding, steri-strips not visualized, gauze dressing intact. Cold pack given. Questions answered, support given. Verbalizes and demonstrates understanding of post-care instructions, written copy given.

## 2025-04-04 ENCOUNTER — TELEPHONE (OUTPATIENT)
Dept: MAMMOGRAPHY | Facility: HOSPITAL | Age: 50
End: 2025-04-04
Payer: COMMERCIAL

## 2025-04-04 LAB
CYTO UR: NORMAL
LAB AP CASE REPORT: NORMAL
LAB AP CLINICAL INFORMATION: NORMAL
LAB AP DIAGNOSIS COMMENT: NORMAL
LAB AP SPECIAL STAINS: NORMAL
PATH REPORT.FINAL DX SPEC: NORMAL
PATH REPORT.GROSS SPEC: NORMAL

## 2025-04-04 NOTE — TELEPHONE ENCOUNTER
Referring provider's office notified pathology returned as cancer and patient will be notified.     Patient notified of pathology results and recommendation. Verbalizes understanding. States having some discomfort, denies needing analgesics. . Denies signs and symptoms of infection.     Patient to research surgeon choice.. Patient is to call back with decision; an appointment will then be scheduled and patient will be notified. Verbalizes understanding.

## 2025-04-04 NOTE — TELEPHONE ENCOUNTER
Patient requests Dr ANTHONY Fontaine for surgical consult. Patient will be notified when an appointment is scheduled. Verbalized understanding.

## 2025-04-04 NOTE — TELEPHONE ENCOUNTER
Patient notified of surgical consult appointment with Dr ANTHONY Fontaine on 4/14/25 @ 10:00 with arrival of 9:30 at [facility]. Patient given office contact & location information. Patient told she will receive an information packet from [facility] with detailed location instructions. Patient notified to bring photo ID, insurance information, list of prescription & OTC medications. Patient may be accompanied by family member or friend for support.    Reviewed what would be discussed at surgical consult visit, including detailed explanation of pathology report and imaging reports; treatment options and pros/cons, availability of breast cancer nurse navigator. Patient encouraged to contact breast cancer nurse navigators, surgeon's office with questions or concerns. Breast cancer information packet offered and accepted. Patient verbalized understanding. Patient information sent to breast cancer nurse navigators, genetics pool for evaluation.

## 2025-04-14 ENCOUNTER — PATIENT OUTREACH (OUTPATIENT)
Dept: OTHER | Facility: HOSPITAL | Age: 50
End: 2025-04-14
Payer: COMMERCIAL

## 2025-04-14 ENCOUNTER — TRANSCRIBE ORDERS (OUTPATIENT)
Dept: ADMINISTRATIVE | Facility: HOSPITAL | Age: 50
End: 2025-04-14
Payer: COMMERCIAL

## 2025-04-14 DIAGNOSIS — Z17.0 MALIGNANT NEOPLASM OF LEFT BREAST IN FEMALE, ESTROGEN RECEPTOR POSITIVE, UNSPECIFIED SITE OF BREAST: Primary | ICD-10-CM

## 2025-04-14 DIAGNOSIS — C50.912 MALIGNANT NEOPLASM OF LEFT BREAST IN FEMALE, ESTROGEN RECEPTOR POSITIVE, UNSPECIFIED SITE OF BREAST: Primary | ICD-10-CM

## 2025-04-14 DIAGNOSIS — D05.12 INTRADUCTAL CARCINOMA IN SITU OF LEFT BREAST: Primary | ICD-10-CM

## 2025-04-14 NOTE — SIGNIFICANT NOTE
Met with patient and her  with Dr. Fontaine. Dr. Fontaine reviewed pathology of IG DCIS and discussed surgical options of lumpectomy with radiation vs. mastectomy. Patient desires lumpectomy. Dr. Fontaine informed patient of need for med onc and rad onc after surgery. Genetics referral placed per patient request but is not for surgical decision making. NN reviewed educational and community resources. Patient encouraged to call with any questions or concerns.    04/14/25 1404   Nurse Navigation   Current Status Active   Type of Visit New patient   Location of Visit Surgeon's office   Visit Diagnosis Breast - malignant   Referral Source Health professional - outpatient   Treatments Surgery   Date of Diagnosis 04/04/25   Surgery - First Consult Appointment 04/14/25   Intervention Tasks Performed Education;Symptom management;Community resources;Cancer prevention/Screening;Supportive services referral   Supportive services referral Genetics referral   Time Navigated Today (Min) 60   Total Time Navigated (Min) 60   Acuity Rating   Time spent with patient 3- greater than 45 minutes   Multimodality treatment coordination and education 2-  Arrange, transfer care, second opinion   Caregiver support 1- Family/significant other support available   Distress score 1- 0-3   Coordination of care  - appts 2- Multiple appts   Appt compliance 1- Compliant   ECOG/Karnofsky Score 1- ECOG -Less than or equal to 1,  Karnofsky 90 or greater   PHQ 9 score  1- <10   Referrals to support services 2- One   Acuity score 14   Acuity level Medium acuity

## 2025-04-16 ENCOUNTER — PRE-ADMISSION TESTING (OUTPATIENT)
Dept: PREADMISSION TESTING | Facility: HOSPITAL | Age: 50
End: 2025-04-16
Payer: COMMERCIAL

## 2025-04-16 LAB
HCT VFR BLD AUTO: 50.5 % (ref 34–46.6)
HGB BLD-MCNC: 16.6 G/DL (ref 12–15.9)
POTASSIUM SERPL-SCNC: 4.5 MMOL/L (ref 3.5–5.2)
QT INTERVAL: 382 MS
QTC INTERVAL: 426 MS

## 2025-04-16 PROCEDURE — 84132 ASSAY OF SERUM POTASSIUM: CPT

## 2025-04-16 PROCEDURE — 36415 COLL VENOUS BLD VENIPUNCTURE: CPT

## 2025-04-16 PROCEDURE — 85014 HEMATOCRIT: CPT

## 2025-04-16 PROCEDURE — 93005 ELECTROCARDIOGRAM TRACING: CPT

## 2025-04-16 PROCEDURE — 85018 HEMOGLOBIN: CPT

## 2025-04-16 NOTE — DISCHARGE INSTRUCTIONS
The following information and instructions were given:    Nothing to eat or drink after midnight except sips of water with routine prescribed medication (except blood thinner, certain blood pressure medications, diabetes, or weight reducing medication) unless otherwise instructed by your physician.  Do not eat, drink, smoke or chew gum after midnight the night before surgery. This also includes no mints.    EXCEPTION: ERAS patients Patient instructed to drink 20 ounces (or until full) of Gatorade and it needs to be completed 2 hours before given arrival time on the day of surgery. (NO RED Gatorade)    Patient verbalized understanding.    DO NOT shave for two days before your procedure.  Do not wear makeup.      DO NOT wear fingernail polish (gel/regular) and/or acrylic/artificial nails on the day of surgery.   If a patient had recent manicure and would rather not remove polish or artificial nails, then the minimum requirement is that the polish/artificial nails must be removed from the middle finger on each hand.      If patient was having surgery on an upper extremity, then the patient was instructed that fingernail polish/artificial fingernails must be removed for surgery.  NO EXCEPTIONS.      If patient was having surgery on a lower extremity, then the patient was instructed that toenail polish on both extremities must be removed for surgery.  NO EXCEPTIONS.    Remove all jewelry (advised to go to jeweler if unable to remove).  Jewelry especially rings can no longer be taped for surgery.    Leave anything you consider valuable at home.      Bring the following with you (if applicable)   -picture ID and insurance cards   -Co-pay/deductible required by insurance   -Medications in the original bottles (not a list) including all over-the-counter meds     Patient's  must remain in the Surgery Center Waiting Room during the procedure.  Patient must have a  for transportation home after procedure.  It must  be an  adult that will take responsibility for care for 24 hours after surgery.

## 2025-04-22 ENCOUNTER — LAB REQUISITION (OUTPATIENT)
Dept: LAB | Facility: HOSPITAL | Age: 50
End: 2025-04-22
Payer: COMMERCIAL

## 2025-04-22 ENCOUNTER — HOSPITAL ENCOUNTER (OUTPATIENT)
Dept: MAMMOGRAPHY | Facility: HOSPITAL | Age: 50
Discharge: HOME | End: 2025-04-22
Payer: COMMERCIAL

## 2025-04-22 DIAGNOSIS — D05.12 INTRADUCTAL CARCINOMA IN SITU OF LEFT BREAST: ICD-10-CM

## 2025-04-22 PROCEDURE — 25010000002 LIDOCAINE 1 % SOLUTION: Performed by: RADIOLOGY

## 2025-04-22 PROCEDURE — 88341 IMHCHEM/IMCYTCHM EA ADD ANTB: CPT | Performed by: SURGERY

## 2025-04-22 PROCEDURE — 19281 PERQ DEVICE BREAST 1ST IMAG: CPT | Performed by: RADIOLOGY

## 2025-04-22 PROCEDURE — 76098 X-RAY EXAM SURGICAL SPECIMEN: CPT | Performed by: RADIOLOGY

## 2025-04-22 PROCEDURE — C1819 TISSUE LOCALIZATION-EXCISION: HCPCS

## 2025-04-22 PROCEDURE — 76098 X-RAY EXAM SURGICAL SPECIMEN: CPT

## 2025-04-22 PROCEDURE — 88307 TISSUE EXAM BY PATHOLOGIST: CPT | Performed by: SURGERY

## 2025-04-22 RX ORDER — LIDOCAINE HYDROCHLORIDE 10 MG/ML
5 INJECTION, SOLUTION INFILTRATION; PERINEURAL ONCE
Status: COMPLETED | OUTPATIENT
Start: 2025-04-22 | End: 2025-04-22

## 2025-04-22 RX ADMIN — Medication 4 ML: at 08:41

## 2025-04-22 RX ADMIN — Medication 4 ML: at 08:30

## 2025-04-29 LAB
CYTO UR: NORMAL
LAB AP CASE REPORT: NORMAL
LAB AP CLINICAL INFORMATION: NORMAL
LAB AP DIAGNOSIS COMMENT: NORMAL
LAB AP SYNOPTIC CHECKLIST: NORMAL
PATH REPORT.FINAL DX SPEC: NORMAL
PATH REPORT.GROSS SPEC: NORMAL

## 2025-05-06 RX ORDER — CYANOCOBALAMIN 1000 UG/ML
1000 INJECTION, SOLUTION INTRAMUSCULAR; SUBCUTANEOUS WEEKLY
Qty: 4 ML | Refills: 3 | Status: SHIPPED | OUTPATIENT
Start: 2025-05-06

## 2025-05-12 RX ORDER — FOLIC ACID 1 MG/1
1 TABLET ORAL DAILY
Qty: 30 TABLET | Refills: 5 | Status: SHIPPED | OUTPATIENT
Start: 2025-05-12

## 2025-05-12 RX ORDER — MELOXICAM 15 MG/1
15 TABLET ORAL DAILY
Qty: 90 TABLET | Refills: 1 | Status: SHIPPED | OUTPATIENT
Start: 2025-05-12

## 2025-05-20 ENCOUNTER — TELEPHONE (OUTPATIENT)
Dept: GENETICS | Facility: HOSPITAL | Age: 50
End: 2025-05-20
Payer: COMMERCIAL

## 2025-05-20 PROCEDURE — 88341 IMHCHEM/IMCYTCHM EA ADD ANTB: CPT | Performed by: SURGERY

## 2025-05-20 PROCEDURE — 88307 TISSUE EXAM BY PATHOLOGIST: CPT | Performed by: SURGERY

## 2025-05-21 ENCOUNTER — LAB REQUISITION (OUTPATIENT)
Dept: LAB | Facility: HOSPITAL | Age: 50
End: 2025-05-21
Payer: COMMERCIAL

## 2025-05-21 DIAGNOSIS — D05.12 INTRADUCTAL CARCINOMA IN SITU OF LEFT BREAST: ICD-10-CM

## 2025-05-23 ENCOUNTER — CLINICAL SUPPORT (OUTPATIENT)
Dept: GENETICS | Facility: HOSPITAL | Age: 50
End: 2025-05-23
Payer: COMMERCIAL

## 2025-05-23 DIAGNOSIS — Z13.79 GENETIC TESTING: Primary | ICD-10-CM

## 2025-05-23 DIAGNOSIS — Z17.0 MALIGNANT NEOPLASM OF LEFT BREAST IN FEMALE, ESTROGEN RECEPTOR POSITIVE, UNSPECIFIED SITE OF BREAST: ICD-10-CM

## 2025-05-23 DIAGNOSIS — C50.912 MALIGNANT NEOPLASM OF LEFT BREAST IN FEMALE, ESTROGEN RECEPTOR POSITIVE, UNSPECIFIED SITE OF BREAST: ICD-10-CM

## 2025-05-23 DIAGNOSIS — Z80.0 FAMILY HISTORY OF STOMACH CANCER: ICD-10-CM

## 2025-05-23 PROCEDURE — 96041 GENETIC COUNSELING SVC EA 30: CPT

## 2025-05-23 NOTE — PROGRESS NOTES
Date of Visit: 2025   Patient Name: Hanane Torres  : 1975   MRN: [id number]     Referring Provider: Yifan Fontaine MD     REASON FOR CONSULT  Hanane Torres is a 49 y.o. female referred for genetic counseling following her breast cancer diagnosis.  At the beginning of April, Ms. 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  She had a lumpectomy performed at the end of April and is schedule to start radiation therapy soon.  Ms. Torres elected to pursue genetic testing via [employer] CancerNext panel with [employer] analyzing 40-cancer risk genes.    PERTINENT FAMILY HISTORY:  A cancer-focused four-generation pedigree was obtained via patient reporting    Maternal Grandmother - stomach cancer, 30's    Limited/no paternal family history information available    RISK ASSESSMENT  Ms. Torres's early onset breast cancer diagnosis is suggestive of hereditary cancer risk.  Ms. Torres meets NCCN guidelines criteria for genetic testing based on her breast cancer diagnosis occurring at age 49.  A significant proportion (>50%) of hereditary breast and ovarian cancer can be attributed to mutations in the BRCA1 and BRCA2 genes.  Females with mutations in BRCA1/2  can have a lifetime breast cancer risk up to 60 - 84%, while the general population risk for breast cancer is 12 - 13%.  BRCA1 mutations can also increase the lifetime risk for ovarian cancer up to 58% and BRCA2 mutations can increase this risk up to 29%.  The general population lifetime ovarian cancer risk is 1 - 2%.  BRCA1/2 mutations can also significantly increase the lifetime risk of certain cancers in males such as prostate cancer.  Mutations in these two genes can also increase the risk for pancreatic cancer and male breast cancer.  There are other clinically significant cancer related genes, as well as other, more rare, hereditary cancer syndromes than can increase risk for  "breast and ovarian cancers. The degree of risk and screening recommendations vary by gene, the individual's age, and related family history.    GENETIC COUNSELING  We reviewed the family history information in detail. Cases of cancer follow three general patterns: sporadic, familial, and hereditary.  While most cancer cases are sporadic (~70% of cancer cases), some cases appear to occur in family clusters.  These cases are said to be familial and account for around 20% of cancer cases.  Familial cases may be due to a combination of shared genes and environmental factors among family members that we cannot evaluate via genetic testing at this time.  In 5% - 10% of cancer cases, the risk for cancer is inherited, and the genes responsible for the increased cancer risk are known.      Family histories typical of hereditary cancer syndromes usually include multiple first- and second-degree relatives diagnosed with cancer types that define a syndrome.  These cases tend to be diagnosed at younger-than-expected ages and can be bilateral or multifocal.  The cancer in these families follows an autosomal dominant inheritance pattern, which indicates the likely presence of a mutation in a cancer gene.  Children and siblings of an individual carrying a mutation have a 50% chance of inheriting that mutation, thereby inheriting the increased risk to develop cancer.  However, it is not recommended to pursue genetic testing for adult-onset cancers in children as the results would not have clinical utility until some time in adulthood among other ethical reasons.  These mutations can be passed down from the maternal or the paternal lineage.    We discussed that risk factors or \"red flags\" for hereditary risk include cancers diagnosed at earlier-than-average ages, multiple family members diagnosed with cancers associated with mutations in the same gene, or multiple generations of associated cancers. Dependent on the specific cancer " type or syndrome, there exists the potential for several clinically significant genes related related to the cancer's onset or increased risk for development.  Therefore, the standard approach to hereditary cancer genetic testing at this time is via multi-gene panel analyzing several genes associated with a hereditary risk for cancer.  Once results are completed, we will review them in the context of the patient's personal and family history to determine what, if any, post-test cancer risk management changes are recommended.  When affected relatives are unavailable or unwilling to pursue genetic testing, it is reasonable to offer genetic testing to an unaffected individual.      GENETIC TESTING  The risks, benefits, and limitations of genetic testing and implications for clinical management following testing were reviewed.  The implications of a positive, negative, or VUS test result were discussed.  Genetic test results can influence decisions regarding screening and prevention.  Genetic testing can have significant psychological implications for both individuals and families. Also discussed was the possibility of insurance discrimination based on genetic test results and the laws in place to prevent this, as well as the limitations of these laws.      The Genetic Information Nondiscrimination Act (WILEY) is a federal law enacted in May 2008.  [place] prohibits discrimination on the basis of genetic information such as genetic test results with respect to health insurance and employment status.  Under Title 1 of WILEY, health insurance companies are prohibited from using an individual's genetic information to change premiums, drop or change an individual's coverage, or prevent coverage from being acquired.  Title 2 of WILEY prohibits employers from using genetic information in employment decisions such as, but not limited to, hiring, firing, promotions, pay, and job assignments.  WILEY protections do not protect against  "genetic discrimination by life insurance, long-term care or disability insurance,  service members, federal employees, those using the  Health Service, or those employed by a small business with fewer than 15 employees.    We discussed panel testing, which could involve testing numerous genes associated with increased cancer risk.  With multigene panel testing, it is not uncommon for a variant of uncertain significance (VUS) to be identified.  Variants are termed \"VUS\" when there is not sufficient evidence to classify the variant as a negative (not harmful) variant or a positive (harmful) mutation. Genetic testing labs work to reclassify all VUSs overtime and will issue an updated report when a reclassification occurs.  The majority (over 90%) of VUSs that are reclassified are found to be negative genetic variants, however a small percentage will be upgraded to a harmful mutation. Clinically, a VUS is treated as a negative result.  If genetic testing is negative or a variant of uncertain significance (VUS) is found, management should be guided by a family history-based risk assessment.  Medical care should not be altered based on a VUS result.  Genetic testing for other unaffected (never had cancer) relatives is not recommended for a VUS.    We discussed that there are limitations to a negative genetic test.  If Ms. Torres tests negative it could be for several different reasons such as:    The possibility that there is a pathogenic variant causing cancer in the family, and that gene was on the patient's test, but Ms. Torres did not inherit it. We cannot know if this is the case by only testing the patient.  A familial mutation could therefore still be present in the family and other close family members are still at risk.  Ms. Torres could have a pathogenic variant in one of the genes tested for that was not detected. Current testing will identify the overwhelming majority of pathogenic " variants, but some are not detectable with current technology.   Ms. Torres may carry a pathogenic variant in another gene associated with hereditary cancer predisposition that was not tested for, or the risk in the family may be due to other genetic factors that are not well understood.    ASSESSMENT AND PLAN  Ms. Torres meets NCCN guidelines for genetic testing.  We reviewed the options for genetic testing including a multi-gene panel of high risk genes, a panel of genes with known management guidelines, or a broader approach including more newly discovered genes. We reviewed the benefits and drawbacks of this approach, including finding mutations in genes that are less well understood, or results that are unexpected based on this family history.  Ms. Torres elected to pursue genetic testing.  [employer]'s CancerNext panel was ordered, which analyzes 40 genes associated with an increased cancer risk. The genes on this panel include APC, GERMAINE, AXIN2, BAP1, BARD1, BMPR1A, BRCA1, BRCA2, BRIP1, CDH1, CDKN2A, CHEK2, EPCAM, FH, FLCN, GREM1, HOXB13, MBD4, MET, MLH1, MSH2, MSH3, MSH6, MUTYH, NF1, NTHL1, PALB2, PMS2, POLD1, POLE, PTEN, RAD51C, RAD51D, RPS20, SMAD4, STK11, TP53, TSC1, TSC2, and VHL.  A blood sample for genetic evaluation will be collected on 5/28/2025.   Genetic testing results are expected in 2 - 4 weeks from 5/23/2025.  We will contact the patient when results are received.  Ms. Torres asked excellent questions during the consult and did not demonstrate any acute psychosocial distress during our visit. This clinic encounter was 30 minutes.      Shahram Mary MS, [facility]  Genetic Counselor  [facility]

## 2025-05-28 ENCOUNTER — TELEPHONE (OUTPATIENT)
Dept: GENETICS | Facility: HOSPITAL | Age: 50
End: 2025-05-28
Payer: COMMERCIAL

## 2025-05-28 NOTE — TELEPHONE ENCOUNTER
Patient called to reschedule her blood draw to tomorrow, 5/29/25. Her appointment has been rescheduled.

## 2025-06-04 ENCOUNTER — CLINICAL SUPPORT (OUTPATIENT)
Dept: GENETICS | Facility: HOSPITAL | Age: 50
End: 2025-06-04
Payer: COMMERCIAL

## 2025-06-04 ENCOUNTER — LAB (OUTPATIENT)
Dept: LAB | Facility: HOSPITAL | Age: 50
End: 2025-06-04
Payer: COMMERCIAL

## 2025-06-04 DIAGNOSIS — Z13.79 GENETIC TESTING: Primary | ICD-10-CM

## 2025-06-04 PROCEDURE — 36415 COLL VENOUS BLD VENIPUNCTURE: CPT

## 2025-06-04 NOTE — PROGRESS NOTES
Ms. Torres presented for blood sample collection for genetic testing as discussed on 5/23/2025. Genetic test results are expected in 2-3 weeks.    Shahram Mary MS, [facility]  Genetic Counselor  [facility]

## 2025-06-16 ENCOUNTER — TELEPHONE (OUTPATIENT)
Dept: GENETICS | Facility: HOSPITAL | Age: 50
End: 2025-06-16
Payer: COMMERCIAL

## 2025-06-17 ENCOUNTER — TELEPHONE (OUTPATIENT)
Dept: GENETICS | Facility: HOSPITAL | Age: 50
End: 2025-06-17
Payer: COMMERCIAL

## 2025-06-17 ENCOUNTER — DOCUMENTATION (OUTPATIENT)
Dept: GENETICS | Facility: HOSPITAL | Age: 50
End: 2025-06-17
Payer: COMMERCIAL

## 2025-06-17 NOTE — PROGRESS NOTES
Date of Visit: 2025  Name: Hanane Torres  : 1975  MRN: [id number]    Referring Provider: Yifan Fontaine MD      REASON FOR CONSULT  Hanane Torres is a 49 y.o. female referred for genetic counseling following her breast cancer diagnosis.  At the beginning of April, Ms. 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  She had a lumpectomy performed at the end of April and is schedule to start radiation therapy soon.  Ms. Torres elected to pursue genetic testing via [facility]Next panel with [employer] analyzing 40-cancer risk genes.  Ms. Torres's genetic test result was NEGATIVE for pathogenic mutations in all 40 - cancer risk genes analyzed.  Ms. Torres was notified of these results via telephone on 2025.  A copy of her family history of cancer and genetic test are attached to this note.     PERTINENT FAMILY HISTORY:  A cancer-focused four-generation pedigree was obtained via patient reporting     Maternal Grandmother - stomach cancer, 30's     Limited/no paternal family history information available    GENETIC TESTING RESULTS AND RECOMMENDATIONS  Genetic testing was performed by [facility] laboratory analyzing 40-cancer-risk genes.    Genetic testing was negative for pathogenic mutations by sequencing, rearrangement testing, and RNA analysis for the 40 genes on the CancerNext panel.  Most cancer cases (~70%) are classified as sporadic in nature.  This result does not clarify the cause of Ms. Torres's breast cancer diagnosis or her family history of cancer, nor does it absolutely rule out a hereditary cause either.  There could be a mutation in the family that explains Ms. Torres's family history of cancer that she did not inherit.  There could be a mutation in the family that explains the pattern of cancer that we cannot identify at this time or in a gene that was not tested.  There could be a mutation in one  of the genes tested that was not detected. Current genetic testing technologies will identify the majority of mutations with high accuracy, but some remain undetectable at this time.  Ms. Torres's personal and family history of cancer could be occurring not due to a single gene mutation, but due to multiple undetectable genetic and environmental factors.  We encourage patients to reach out over time to inquire about any new methodologies of testing or newly identified cancer risk genes that could explain their personal or family history of cancer.    GENETIC COUNSELING  We reviewed the family history and her personal cancer history information in detail. Cases of cancer follow three general patterns: sporadic, familial, and hereditary.  While most cancer cases are sporadic (~70% of cancer cases), some cases appear to occur in family clusters.  These cases are said to be familial and account for around 20% of cancer cases.  Familial cases may be due to a combination of shared genes and environmental factors among family members that we cannot evaluate via genetic testing at this time.  In 5% - 10% of cancer cases, the risk for cancer is inherited, and the genes responsible for the increased cancer risk are known.       Family histories typical of hereditary cancer syndromes usually include multiple first- and second-degree relatives diagnosed with cancer types that define a syndrome.  These cases tend to be diagnosed at younger-than-expected ages and can be bilateral or multifocal.  The cancer in these families follows an autosomal dominant inheritance pattern, which indicates the likely presence of a mutation in a cancer gene.  Children and siblings of an individual carrying a mutation have a 50% chance of inheriting that mutation, thereby inheriting the increased risk to develop cancer.  However, it is not recommended to pursue genetic testing for adult-onset cancers in children as the results would not have  "clinical utility until some time in adulthood among other ethical reasons.  These mutations can be passed down from the maternal or the paternal lineage.     We discussed that risk factors or \"red flags\" for hereditary risk include cancers diagnosed at earlier-than-average ages, multiple family members diagnosed with cancers associated with mutations in the same gene, or multiple generations of associated cancers. Dependent on the specific cancer type or syndrome, there exists the potential for several clinically significant genes related to the cancer's onset or increased risk for development.  Therefore, the standard approach to hereditary cancer genetic testing at this time is via multi-gene panel analyzing several genes associated with a hereditary risk for cancer.  We reviewed the results in the context of the patient's personal and family history to determine what, if any, post-test cancer risk management changes are recommended.     GENETIC TESTING  The risks, benefits, and limitations of genetic testing and implications for clinical management following testing were reviewed.  We discussed the implications and limitations of a positive, negative, or variant of uncertain significance (VUS) test result.  Genetic test results can influence decisions regarding screening and prevention.  Genetic testing can have significant psychological implications for both individuals and families. Also discussed was the possibility of insurance discrimination based on genetic test results and the laws in place to prevent this, as well as the limitations of these laws.       The Genetic Information Nondiscrimination Act (WILEY) is a federal law enacted in May 2008.  [place] prohibits discrimination on the basis of genetic information such as genetic test results with respect to health insurance and employment status.  Under Title 1 of [place]LEY, health insurance companies are prohibited from using an individual's genetic information to " change premiums, drop or change an individual's coverage, or prevent coverage from being acquired.  Title 2 of WILEY prohibits employers from using genetic information in employment decisions such as, but not limited to, hiring, firing, promotions, pay, and job assignments.  WILEY protections do not protect against genetic discrimination by life insurance, long-term care or disability insurance,  service members, federal employees, those using the  Health Service, or those employed by a small business with fewer than 15 employees.     FAMILY CONSIDERATIONS  Genetic testing for Ms. Torres's relatives may be informative despite Ms. Torres's negative test result if they desired to better understand their hereditary cancer risk.  Testing unaffected family members may result in negative results as well or VUS's that would not clarify her personal or family history of cancer.  Ms. Torres cannot pass on mutations that she does not have.  Family members are encouraged to discuss their family history of cancer and appropriate cancer screening recommendations with their healthcare providers.  Family members are also encouraged to inquire about information regarding genetics and genetic testing, if appropriate, at a clinic that offers genetic counseling or their healthcare provider.  We would be happy to see relatives in our clinic for genetic counseling and testing.  They can request a referral from their healthcare provider to [facility] and that will prompt a coordinator to call them for an appointment.   For family members who live elsewhere, there are genetic counselors at most [facility].  They can find a genetic counselor by visiting the National Society of Genetic Counselors website at www.nsgc.org or they can call our office and we would be happy to give them the contact information of the closest genetic counselor.      SUMMARY  Ms. Torres's hereditary cancer  genetic test identified no pathogenic mutations explaining why her breast cancer may have developed or her family history of cancer, nor increasing her risk for any other cancer.  It is recommended that Ms. Torres follow her provider's recommendations for future cancer screening and future risk reduction.        Shahram Mary MS, [facility]  Genetic Counselor  [facility]    Cc:  Hanane Yifan Constantino MD Yates, Catharine A, RN

## 2025-06-17 NOTE — TELEPHONE ENCOUNTER
Spoke with patient and disclosed negative genetic results. Informed patient these results would be on [facility] and sent to their

## 2025-06-20 ENCOUNTER — TELEPHONE (OUTPATIENT)
Dept: ONCOLOGY | Facility: CLINIC | Age: 50
End: 2025-06-20

## 2025-06-20 NOTE — TELEPHONE ENCOUNTER
Caller: Hanane Torres    Relationship to patient: Self    Best call back number: [phone]     Chief complaint: PATIENT TO RESCHEDULE 7/1/25 NEW ONC APPT    Type of visit: NEW ONC    Requested date: PATIENT CNAN DO ANY DAY OF WEEK BUT TUESDAY

## 2025-06-25 ENCOUNTER — TELEPHONE (OUTPATIENT)
Dept: RADIATION ONCOLOGY | Facility: HOSPITAL | Age: 50
End: 2025-06-25
Payer: COMMERCIAL

## 2025-06-25 ENCOUNTER — OFFICE VISIT (OUTPATIENT)
Dept: RADIATION ONCOLOGY | Facility: HOSPITAL | Age: 50
End: 2025-06-25
Payer: COMMERCIAL

## 2025-06-25 ENCOUNTER — HOSPITAL ENCOUNTER (OUTPATIENT)
Dept: RADIATION ONCOLOGY | Facility: HOSPITAL | Age: 50
Setting detail: RADIATION/ONCOLOGY SERIES
Discharge: HOME | End: 2025-06-25
Payer: COMMERCIAL

## 2025-06-25 VITALS
HEART RATE: 107 BPM | BODY MASS INDEX: 37.8 KG/M2 | SYSTOLIC BLOOD PRESSURE: 108 MMHG | HEIGHT: 64 IN | OXYGEN SATURATION: 98 % | WEIGHT: 221.4 LBS | RESPIRATION RATE: 16 BRPM | TEMPERATURE: 98.1 F | DIASTOLIC BLOOD PRESSURE: 74 MMHG

## 2025-06-25 DIAGNOSIS — D05.12 NEOPLASM OF LEFT BREAST, PRIMARY TUMOR STAGING CATEGORY TIS: DUCTAL CARCINOMA IN SITU (DCIS): Primary | ICD-10-CM

## 2025-06-25 PROCEDURE — G0463 HOSPITAL OUTPT CLINIC VISIT: HCPCS

## 2025-06-25 NOTE — PROGRESS NOTES
New Patient Office Visit      Encounter Date: 06/25/2025   Patient Name: Hanane Torres  YOB: 1975   Medical Record Number: [id number]   Primary Diagnosis: Neoplasm of left breast, primary tumor staging category Tis: ductal carcinoma in situ (DCIS) [D05.12]   Cancer Staging: Cancer Staging   No matching staging information was found for the patient.      Chief Complaint:    Chief Complaint   Patient presents with    Breast Cancer       History of Present Illness: Hanane Torres is a 49 y.o. female who is here for consultation regarding her left breast DCIS. This was diagnosed following an abnormality on screening mammography. Diagnostic mammography/US identified calcifications in the UOQ and LOQ of the left breast for which biopsy was recommended. Initial attempt at biopsy was nondiagnostic because of hematoma formation. Second attempt revealed ER+/WV+ int grade DCIS.   Left lumpectomy 4/2025 - 1.3 cm int grade DCIS, inferior margin involved. Reexcision for negative margin - posterior margin negative by 5mm, medial margin within 1mm  She is recovering well. 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   She is understandably anxious about next steps.   Denies fever, discharge, drainage.     Age at menarche:  9  Age at menopause:  49  Hormone replacement therapy:  No  Personal history of breast cancer:  No  Family history of breast cancer:  No  Radiation to chest before age of 29:  No  Age of first live birth:  33    Prior Radiation History: no  Pacemaker or ICD: no  Pregnant or Nursing: no    Subjective      Review of Systems: Review of Systems   Constitutional:  Positive for fatigue.   Cardiovascular:         Pt reports Sinus Tachycardia that is normal for her.   Gastrointestinal:  Positive for nausea.        Pt reports new onset nausea that she feels is r/t anxiety; pt manages w/ prescription Zofran.    Genitourinary:         Pt reports some mild, int urinary stress incontinence   Psychiatric/Behavioral:  Positive for sleep disturbance. The patient is nervous/anxious.         Pt reports increased sleeping over the past several years       Past Medical History:   Past Medical History:   Diagnosis Date    Anemia     Arthritis     Breast cancer 2025    Left    Bronchitis     Chronic pain disorder     Cluster headache     Depression     Endometriosis     Fibromyalgia, primary     Headache     Headache, tension-type     Hyperlipidemia     Hypertension     Joint pain     Low back pain     Osteoarthritis 2020    JUAN JOSE    Visual impairment        Past Surgical History:   Past Surgical History:   Procedure Laterality Date    BREAST LUMPECTOMY Left 2025    CARPAL TUNNEL RELEASE       SECTION      X3    CHOLECYSTECTOMY      GALLBLADDER SURGERY      HIP ARTHROPLASTY Right 2020    JOINT REPLACEMENT  [id number]    JUAN JOSE right    ORTHOPEDIC SURGERY         Family History:   Family History   Problem Relation Age of Onset    Kidney disease Mother     Diabetes Mother     Arthritis Mother     Obesity Mother     COPD Mother     Hyperlipidemia Father     Obesity Sister     Diabetes Sister     Arthritis Sister     Hypertension Sister     Breast cancer Neg Hx        Social History:   Social History     Socioeconomic History    Marital status:    Tobacco Use    Smoking status: Every Day     Types: Electronic Cigarette     Passive exposure: Past    Smokeless tobacco: Never    Tobacco comments:     currently vapes, pt states she stopped cigarettes about 7 yrs ago   Vaping Use    Vaping status: Every Day    Substances: Nicotine, Flavoring    Devices: Refillable tank    Passive vaping exposure: Yes   Substance and Sexual Activity    Alcohol use: No    Drug use: No    Sexual activity: Yes     Partners: Male     Birth control/protection: Tubal ligation       Medications:     Current Outpatient Medications:      "amphetamine-dextroamphetamine (Adderall) 30 MG tablet, Take 1 tablet by mouth Every Afternoon., Disp: 30 tablet, Rfl: 0    atorvastatin (Lipitor) 40 MG tablet, Take 1 tablet by mouth Daily., Disp: 90 tablet, Rfl: 3    Cariprazine HCl (Vraylar) 4.5 MG capsule capsule, Take 1 capsule by mouth Daily., Disp: 90 capsule, Rfl: 0    cyanocobalamin 1000 MCG/ML injection, Inject 1 mL into the appropriate muscle as directed by prescriber 1 (One) Time Per Week for 8 weeks, then 1 ml per month thereafter, Disp: 4 mL, Rfl: 3    FLUoxetine (PROzac) 40 MG capsule, Take 1 capsule by mouth Daily., Disp: 90 capsule, Rfl: 0    folic acid (FOLVITE) 1 MG tablet, Take 1 tablet by mouth Daily., Disp: 30 tablet, Rfl: 5    lisdexamfetamine (Vyvanse) 60 MG capsule, Take 1 capsule by mouth Daily, Disp: 30 capsule, Rfl: 0    lisinopril (PRINIVIL,ZESTRIL) 20 MG tablet, Take 1 tablet by mouth Daily., Disp: 30 tablet, Rfl: 6    meloxicam (MOBIC) 15 MG tablet, Take 1 tablet by mouth Daily with Food., Disp: 90 tablet, Rfl: 1    Syringe/Needle, Disp, (BD Eclipse Syringe) 25G X 1\" 3 ML misc, Use 1 syringe 1 (One) Time Per Week with B-12 injection., Disp: 8 each, Rfl: 3    cephalexin (KEFLEX) 500 MG capsule, Take 4 capsules by mouth 1 hour before dental appointment (Patient not taking: Reported on 6/25/2025), Disp: 4 capsule, Rfl: 10    FLUoxetine (PROzac) 40 MG capsule, Take 1 capsule by mouth Daily., Disp: 90 capsule, Rfl: 0    FLUoxetine (PROzac) 40 MG capsule, Take 1 capsule by mouth Daily., Disp: 90 capsule, Rfl: 0    lisdexamfetamine (Vyvanse) 40 MG capsule, Take 1 capsule by mouth Daily (Patient not taking: Reported on 6/25/2025), Disp: 30 capsule, Rfl: 0    lisdexamfetamine (Vyvanse) 60 MG capsule, Take 1 capsule by mouth Daily, Disp: 30 capsule, Rfl: 0    lisdexamfetamine (Vyvanse) 60 MG capsule, Take 1 capsule by mouth Daily (Patient not taking: Reported on 6/25/2025), Disp: 30 capsule, Rfl: 0    norethindrone (AYGESTIN) 5 MG tablet, Take 2 " "tablets by mouth Daily., Disp: 180 tablet, Rfl: 2    ondansetron ODT (ZOFRAN-ODT) 4 MG disintegrating tablet, 1 (one) Tablet by mouth every six hours, as needed (Patient not taking: Reported on 6/25/2025), Disp: 30 tablet, Rfl: 0    oxyCODONE-acetaminophen (PERCOCET) 5-325 MG per tablet, 1 (one) Tablet by mouth every four hours, as needed, Disp: 10 tablet, Rfl: 0    Allergies:   No Known Allergies      Advanced Care Plan: N Advanced Care Planning was discussed. The patient does not have a living will documented in the medical record and declined to perform one today.  KPS/Quality of Life: 90 - Limited Activity  ECOG: (1) Restricted in physically strenuous activity, ambulatory and able to do work of light nature      Objective     Physical Exam:   Vital Signs:   Vitals:    06/25/25 1405   BP: 108/74   Pulse: 107   Resp: 16   Temp: 98.1 °F (36.7 °C)   TempSrc: Temporal   SpO2: 98%   Weight: 100 kg (221 lb 6.4 oz)   Height: 162.6 cm (64\")   PainSc: 0-No pain     Body mass index is 38 kg/m².     Constitutional: The patient is a well-developed, well-nourished female  in no acute distress.  Alert and oriented ×3.  General: NAD, sitting comfortably  Eye: EOMI, anicteric sclerae  HENT: NC/AT, MMM  Neck: No JVD or cervical lymphadenopathy  Respiratory: Symmetric expansion, nonlabored respiration  Cardiovascular: Regular rate and rhythm.  No murmurs, rubs, or gallops are appreciated.  Abdomen: nontender, nondistended.   Musculoskeletal: No obvious joint deformities, range of motion intact in all 4 extremities  Neuro: Alert oriented x3, cranial nerves III through XII are grossly intact, with no focal neurological deficits noted on exam.  Psych: Mood and affect appropriate        Assessment / Plan      Assessment/Plan:   Hanane Torres is a pleasant 49 y.o. female with hormone receptor positive DCIS of the left breast managed with breast conserving surgery, with reexcision on 5/20 for negative margins.  She understands " that the role of radiation therapy after a lumpectomy is to decrease the risk of an ipsilateral breast tumor recurrence. She is interested in proceeding with treatment.     Because of her age and close surgical margins , we discussed the modest improvement in local control gained from a tumor bed boost, at the slight expense of cosmetic outcome. She is interested in the addition of a tumor bed boost.    I anticipate treating her whole breast to a dose of 40 Gy/15 fractions. She would like to think about whether to proceed with tumor bed boost and will let us know at her next visit.    We discussed anticipated cosmetic outcomes as well as acute and late toxicities associated with radiation therapy. For her case, radiation related toxicity would be secondary to effects of radiation on the skin, remaining breast tissue, ipsilateral lung, and heart. Acute toxicities include fatigue, dermatitis, changes in skin pigmentation, and pneumonitis. Late toxicities include telangectasias, soft tissue fibrosis, pulmonary fibrosis, lymphedema, and an increased personal risk of cardiac events. She understands that while radiation is used as an important part of management with her existing diagnosis, there is a small possibility that she may develop a radiation-induced secondary malignancy in the treatment field in the future.    She is of childbearing age and denies currently being pregnant. She was counseled to avoid conception and we spoke about the serious effects of radiation exposure to a fetus.     Consent for treatment was obtained and she will return for CT simulation.         Diagnoses and all orders for this visit:    1. Neoplasm of left breast, primary tumor staging category Tis: ductal carcinoma in situ (DCIS) (Primary)  -     Ambulatory Referral to ONC Social Work         Follow Up:  Return in about 2 weeks (around 7/9/2025) for CT SIM next visit.      Time:   I spent 65 minutes on this encounter today, 06/26/25.  Activities that took place during this time include: preparing to see the patient, obtaining history, reviewing separately obtained history, performing a medically appropriate examination and evaluation, counseling and educating the patient, ordering medications/tests/procedures, communicating with other healthcare providers, and coordinating care for this patient.     Sincerely,        Graciela Ambrose MD  Radiation Oncology  This document has been signed by Graciela Amrbose MD on June 26, 2025 15:27 EDT     NOTICE TO PATIENTS:   At [facility], we believe that sharing information builds trust and better relationships. You are receiving this note because you recently visited [facility]. It is possible you will see health information before a provider has talked with you about it. This kind of information can be easy to misunderstand. To help you fully understand what it means for your health, we urge you to discuss this note with your provider.

## 2025-06-25 NOTE — TELEPHONE ENCOUNTER
Left vm explaining pt has appointment for CT simulation Mon July 7, 2025 @ 3pm [address] [facility].  Instructed to call back with any questions.    show

## 2025-06-26 ENCOUNTER — DOCUMENTATION (OUTPATIENT)
Dept: OTHER | Facility: HOSPITAL | Age: 50
End: 2025-06-26
Payer: COMMERCIAL

## 2025-06-26 NOTE — PROGRESS NOTES
Distress Screening Follow-up    Name: Hanane Torres    : 1975    Diagnosis: Breast cancer    Location of Distress Screening: Radiation Oncology    Distress Level: 5 (2025  2:17 PM)      Physical Concerns:  Fatigue: Y      Emotional Concerns:  Worry or anxiety: Y      Practical Concerns:  Taking care of others: Y  Treatment decisions: Y         Interventions:        Comments:   Social Work was called to follow up with patient regarding recent distress screen results.  left a voicemail with patient introducing herself, and offered additional support.  provided patient with a call back number, ([phone]) and encouraged patient to reach out at their convenience.      will remain available to offer support.     Araceli Villar   Oncology Social Worker

## 2025-06-27 NOTE — PROGRESS NOTES
made additional attempt to contact patient to follow up with patient regarding recent distress screen results.  left a voicemail with patient introducing herself, and offered additional support.  provided patient with a call back number, ([phone]) and encouraged patient to reach out at their convenience.      will remain available to offer support.     Araceli Villar   Oncology Social Worker

## 2025-07-02 ENCOUNTER — CONSULT (OUTPATIENT)
Dept: ONCOLOGY | Facility: CLINIC | Age: 50
End: 2025-07-02
Payer: COMMERCIAL

## 2025-07-02 VITALS
OXYGEN SATURATION: 97 % | WEIGHT: 222 LBS | SYSTOLIC BLOOD PRESSURE: 115 MMHG | HEIGHT: 64 IN | DIASTOLIC BLOOD PRESSURE: 84 MMHG | BODY MASS INDEX: 37.9 KG/M2 | HEART RATE: 85 BPM | TEMPERATURE: 97.3 F

## 2025-07-02 DIAGNOSIS — Z72.0 TOBACCO USE: ICD-10-CM

## 2025-07-02 DIAGNOSIS — Z13.9 DUE FOR SCREENING: Primary | ICD-10-CM

## 2025-07-02 NOTE — PROGRESS NOTES
H[facility]ology and Oncology [facility]  Office number [phone]    Fax number [phone]     New Patient Office Visit      Date: 2025     Patient Name: Hanane Torres  MRN: [id number]  : 1975    Referring Physician: Dr. Yifan Fontaine MD    Chief Complaint: Left breast DCIS    Cancer Stagin    History of Present Illness: Hanane Torres is a pleasant 49 y.o. female who presents today for evaluation of left breast DCIS.    Screening mammogram 2025 showed an asymmetry in her anterior superior breast.    Diagnostic mammogram due 525 showed punctate calcifications in the upper outer and lower quadrants of the left breast.  Asymmetry in the superior left breast did not persist.    Stereotactic biopsy 2025 was benign.    Left breast stereotactic biopsy 2025 showed DCIS, grade 2 (ER 95%, AL 75%).    Left breast lumpectomy for  showed intermediate grade DCIS spanning 1.3 cm with associated calcifications.  Medial and deep margins, less than 1 mm.    Re-excision 2025 demonstrating residual DCIS, intermediate grade, with final margin less than 1 mm.    LMP 1 year heavy Was on OCP continuously for heavy periods> 10 years, stopped at diagnosis    No baseline colonoscopy.    1 pack/day smoking history since the age of 11.  Switched from cigarettes to vaping approximately 10 years ago.    Review of Systems:   I have reviewed the review of systems completed by the patient. This is negative for clinically significant symptoms except as noted below. This document has been scanned into the patient's chart. Denies.    Past Medical History:   Past Medical History:   Diagnosis Date    Anemia     Arthritis     Breast cancer 2025    Left    Bronchitis     Chronic pain disorder     Cluster headache     Depression     Endometriosis     Fibromyalgia, primary     Headache     Headache, tension-type     Hyperlipidemia     Hypertension     Joint pain     Low back pain      Osteoarthritis     JUAN JOSE    Visual impairment    No personal history of myocardial infarction, cerebrovascular event, or venous thromboembolism.  Endometriosis s/p surgery in 20s.   Leep in early 20s  Endometrial biopsy  with Dr. Crissy Murphy    Past Surgical History:   Past Surgical History:   Procedure Laterality Date    BREAST LUMPECTOMY Left 2025    CARPAL TUNNEL RELEASE       SECTION      X3    CHOLECYSTECTOMY      GALLBLADDER SURGERY      HIP ARTHROPLASTY Right 2020    JOINT REPLACEMENT  [id number]    JUAN JOSE right    ORTHOPEDIC SURGERY         Family History:   Family History   Problem Relation Age of Onset    Kidney disease Mother     Diabetes Mother     Arthritis Mother     Obesity Mother     COPD Mother     Hyperlipidemia Father     Obesity Sister     Diabetes Sister     Arthritis Sister     Hypertension Sister     Breast cancer Neg Hx    MGM stomach cancer in her 60s.    Social History:   Social History     Socioeconomic History    Marital status:    Tobacco Use    Smoking status: Every Day     Types: Electronic Cigarette     Passive exposure: Past    Smokeless tobacco: Never    Tobacco comments:     currently vapes, pt states she stopped cigarettes about 7 yrs ago   Vaping Use    Vaping status: Every Day    Substances: Nicotine, Flavoring    Devices: Refillable tank    Passive vaping exposure: Yes   Substance and Sexual Activity    Alcohol use: No    Drug use: No    Sexual activity: Yes     Partners: Male     Birth control/protection: Tubal ligation       Medications:     Current Outpatient Medications:     amphetamine-dextroamphetamine (Adderall) 30 MG tablet, Take 1 tablet by mouth Every Afternoon., Disp: 30 tablet, Rfl: 0    atorvastatin (Lipitor) 40 MG tablet, Take 1 tablet by mouth Daily., Disp: 90 tablet, Rfl: 3    Cariprazine HCl (Vraylar) 4.5 MG capsule capsule, Take 1 capsule by mouth Daily., Disp: 90 capsule, Rfl: 0    cyanocobalamin 1000 MCG/ML injection, Inject 1 mL  "into the appropriate muscle as directed by prescriber 1 (One) Time Per Week for 8 weeks, then 1 ml per month thereafter, Disp: 4 mL, Rfl: 3    FLUoxetine (PROzac) 40 MG capsule, Take 1 capsule by mouth Daily., Disp: 90 capsule, Rfl: 0    folic acid (FOLVITE) 1 MG tablet, Take 1 tablet by mouth Daily., Disp: 30 tablet, Rfl: 5    lisdexamfetamine (Vyvanse) 60 MG capsule, Take 1 capsule by mouth Daily, Disp: 30 capsule, Rfl: 0    lisinopril (PRINIVIL,ZESTRIL) 20 MG tablet, Take 1 tablet by mouth Daily., Disp: 30 tablet, Rfl: 6    meloxicam (MOBIC) 15 MG tablet, Take 1 tablet by mouth Daily with Food., Disp: 90 tablet, Rfl: 1    Syringe/Needle, Disp, (BD Eclipse Syringe) 25G X 1\" 3 ML misc, Use 1 syringe 1 (One) Time Per Week with B-12 injection., Disp: 8 each, Rfl: 3    Allergies:   No Known Allergies    Objective     Vital Signs:   Vitals:    07/02/25 1458   BP: 115/84   Pulse: 85   Temp: 97.3 °F (36.3 °C)   TempSrc: Infrared   SpO2: 97%   Weight: 101 kg (222 lb)   Height: 162.6 cm (64\")   PainSc: 0-No pain    Body mass index is 38.11 kg/m².   Pain Score    07/02/25 1458   PainSc: 0-No pain       ECOG Performance Status: 0 - Asymptomatic    Physical Exam:   General: No acute distress. Well appearing   HEENT: Normocephalic, atraumatic. Sclera anicteric.   Neck: supple, no adenopathy.   Cardiovascular: regular rate and rhythm. No murmurs.   Respiratory: Normal rate. Clear to auscultation bilaterally  Abdomen: Soft, nontender, non distended with normoactive bowel sounds  Lymph: no cervical, supraclavicular or axillary adenopathy  Neuro: Alert and oriented x 3. No focal deficits.   Ext: Symmetric, no swelling.     Laboratory/Imaging Reviewed:   No visits with results within 2 Week(s) from this visit.   Latest known visit with results is:   Lab Requisition on 05/20/2025   Component Date Value Ref Range Status    Case Report 05/20/2025    Final                    Value:Surgical Pathology Report                         Case: " [id number]                                  Authorizing Provider:  Yifan Fontaine MD    Collected:           05/20/2025 01:30 PM          Ordering Location:     [facility]   Received:            05/21/2025 07:25 AM                                 LABORATORY                                                                   Pathologist:           Urbano Perez MD                                                        Specimens:   1) - Breast, Left                                                                                   2) - Breast, Left                                                                          Clinical Information 05/20/2025    Final                    Value:Intraductal carcinoma in situ of left breast      Final Diagnosis 05/20/2025    Final                    Value:1.  LEFT BREAST, MEDIAL MARGIN EXCISION:  Ductal carcinoma in situ, intermediate grade, present adjacent to prior lumpectomy changes.  Carcinoma in situ present less than 1 mm from new final margin (see comment).  Breast tissue with benign fibrocystic change.  Calcifications present associated with ductal carcinoma in situ.    2.  LEFT BREAST, POSTERIOR MARGIN:  Ductal carcinoma in situ, intermediate grade, cribriform pattern, present adjacent to prior lumpectomy changes.  New margin clear by greater than 5 mm.      Comment 05/20/2025    Final                    Value:In the medial margin excision there is a focus of atypia characterized by large ducts lined by atypical epithelium.  This is associated with calcifications.  Immunohistochemical staining shows a loss of normal CK5/6 mosaicism in this area with estrogen receptor showing relatively strong and near uniform positivity.  These findings support the presence of atypia and would be consistent with residual ductal carcinoma in situ.  DCIS is present less than 1 mm from the new margin.    Ductal carcinoma in situ is also identified in the extended  "posterior margin present greater than 5 mm from the new true margin.      Gross Description 05/20/2025    Final                    Value:1. Breast, Left.  Received in formalin labeled \"left breast medial margin stitch marks new margin\" is a 4.7 x 3.7 x 0.8 cm portion of fibrofatty breast tissue with a stitch designated new margin.  Multiple embedded staples are present on the old margin.  The new margin is inked blue and the old margin is inked black.  Sectioning reveals scattered fat necrosis, but no other gross lesions.  The specimen is submitted entirely and sequentially in blocks 1A-1G.    2. Breast, Left.  Received in formalin labeled \"left breast posterior margin stitch marks new margin\" is a 3.7 x 3.4 x 1.9 cm portion of fibrofatty breast tissue which is oriented by the surgeon with a stitch designated new margin.  Multiple embedded staples are present on the old margin.  The new margin is inked blue and the old margin is inked black.  Sectioning reveals scattered fat necrosis, but no other gross lesions.  The specimen is submitted entirely and sequentially in blocks 2A-2K.  LDP        Microscopic Description 05/20/2025    Final                    Value:The slides are reviewed and demonstrate histopathologic features supporting the above rendered diagnosis.           No results found.    Procedures    Assessment / Plan      Assessment/Plan:   DCIS  I reviewed the patient's history, imaging and pathology as outlined in the HPI.  We reviewed that DCIS represents a non-invasive or \"precancerous\" condition.  She has had surgery to remove the existing DCIS.  Radiation will further reduce her risk of ipsilateral recurrence. We reviewed the role of endocrine therapy to reduce the risk of future malignancy in the ipsilateral and contralateral breast.  There is no evidence that this increases her survival, but would decrease the odds of subsequent malignancy.  We discussed that the alternative would be to forgo " endocrine therapy and proceed with close observation with exams and mammography.   We reviewed side effects and schedule of tamoxifen. We specifically reviewed the risk for serious or fatal venous thromboembolism, hot flashes, muscle symptoms, hepatotoxicity, vaginal dryness and cataracts. We discussed the risk for endometrial hyperplasia/malignancies.  She should have annual gynecology exams and seek attention for any abnormal vaginal bleeding.  She should have CMP monitoring.  DVT precautions reviewed.  -She asks about drug interactions with her current medications.  We discussed current Asco guidelines support continuation of stable antidepression medications in patients with cyp 2d6 interaction  - At the end of our conversation, the patient was motivated for adjuvant radiation but declined endocrine therapy prophylaxis.  - I recommend that she follow-up with her breast surgeon for her high risk mammography surveillance.    2. Due for screening  3. Tobacco use  Referral for lung screening CT and colonoscopy    Follow Up:   MARIPOSA Dash MD  Hematology and Oncology

## 2025-07-02 NOTE — LETTER
2025     Keren Mackey PA-C  [address]  [address]  [facility] [address]    Patient: Hanane Torres   YOB: 1975   Date of Visit: 2025     Dear Keren Maceky PA-C:       Thank you for referring Hanane Torres to me for evaluation. Below are the relevant portions of my assessment and plan of care.    If you have questions, please do not hesitate to call me. I look forward to following Hanane along with you.         Sincerely,        Julia Dash MD        CC: MD Hardy Cruz Amie E, MD  [phone]  Sign when Signing Visit    Hematology and Oncology [place]  Office number [phone]    Fax number [phone]     New Patient Office Visit      Date: 2025     Patient Name: Hanane Torres  MRN: [id number]  : 1975    Referring Physician: Dr. Yifan Fontaine MD    Chief Complaint: Left breast DCIS    Cancer Stagin    History of Present Illness: Hanane Torres is a pleasant 49 y.o. female who presents today for evaluation of left breast DCIS.    Screening mammogram 2025 showed an asymmetry in her anterior superior breast.    Diagnostic mammogram due  showed punctate calcifications in the upper outer and lower quadrants of the left breast.  Asymmetry in the superior left breast did not persist.    Stereotactic biopsy 2025 was benign.    Left breast stereotactic biopsy 2025 showed DCIS, grade 2 (ER 95%, CA 75%).    Left breast lumpectomy for  showed intermediate grade DCIS spanning 1.3 cm with associated calcifications.  Medial and deep margins, less than 1 mm.    Re-excision 2025 demonstrating residual DCIS, intermediate grade, with final margin less than 1 mm.    LMP 1 year heavy Was on OCP continuously for heavy periods> 10 years, stopped at diagnosis    No baseline colonoscopy.    1 pack/day smoking history since the age of 11.  Switched from cigarettes to vaping approximately 10  years ago.    Review of Systems:   I have reviewed the review of systems completed by the patient. This is negative for clinically significant symptoms except as noted below. This document has been scanned into the patient's chart. Denies.    Past Medical History:   Past Medical History:   Diagnosis Date   • Anemia    • Arthritis    • Breast cancer 2025    Left   • Bronchitis    • Chronic pain disorder    • Cluster headache    • Depression    • Endometriosis    • Fibromyalgia, primary    • Headache    • Headache, tension-type    • Hyperlipidemia    • Hypertension    • Joint pain    • Low back pain    • Osteoarthritis     JUAN JOSE   • Visual impairment    No personal history of myocardial infarction, cerebrovascular event, or venous thromboembolism.  Endometriosis s/p surgery in 20s.   Leep in early 20s  Endometrial biopsy  with Dr. Crissy Murphy    Past Surgical History:   Past Surgical History:   Procedure Laterality Date   • BREAST LUMPECTOMY Left 2025   • CARPAL TUNNEL RELEASE     •  SECTION      X3   • CHOLECYSTECTOMY     • GALLBLADDER SURGERY     • HIP ARTHROPLASTY Right    • JOINT REPLACEMENT  [id number]    JUAN JOSE right   • ORTHOPEDIC SURGERY         Family History:   Family History   Problem Relation Age of Onset   • Kidney disease Mother    • Diabetes Mother    • Arthritis Mother    • Obesity Mother    • COPD Mother    • Hyperlipidemia Father    • Obesity Sister    • Diabetes Sister    • Arthritis Sister    • Hypertension Sister    • Breast cancer Neg Hx    MGM stomach cancer in her 60s.    Social History:   Social History     Socioeconomic History   • Marital status:    Tobacco Use   • Smoking status: Every Day     Types: Electronic Cigarette     Passive exposure: Past   • Smokeless tobacco: Never   • Tobacco comments:     currently vapes, pt states she stopped cigarettes about 7 yrs ago   Vaping Use   • Vaping status: Every Day   • Substances: Nicotine, Flavoring   • Devices:  "Refillable tank   • Passive vaping exposure: Yes   Substance and Sexual Activity   • Alcohol use: No   • Drug use: No   • Sexual activity: Yes     Partners: Male     Birth control/protection: Tubal ligation       Medications:     Current Outpatient Medications:   •  amphetamine-dextroamphetamine (Adderall) 30 MG tablet, Take 1 tablet by mouth Every Afternoon., Disp: 30 tablet, Rfl: 0  •  atorvastatin (Lipitor) 40 MG tablet, Take 1 tablet by mouth Daily., Disp: 90 tablet, Rfl: 3  •  Cariprazine HCl (Vraylar) 4.5 MG capsule capsule, Take 1 capsule by mouth Daily., Disp: 90 capsule, Rfl: 0  •  cyanocobalamin 1000 MCG/ML injection, Inject 1 mL into the appropriate muscle as directed by prescriber 1 (One) Time Per Week for 8 weeks, then 1 ml per month thereafter, Disp: 4 mL, Rfl: 3  •  FLUoxetine (PROzac) 40 MG capsule, Take 1 capsule by mouth Daily., Disp: 90 capsule, Rfl: 0  •  folic acid (FOLVITE) 1 MG tablet, Take 1 tablet by mouth Daily., Disp: 30 tablet, Rfl: 5  •  lisdexamfetamine (Vyvanse) 60 MG capsule, Take 1 capsule by mouth Daily, Disp: 30 capsule, Rfl: 0  •  lisinopril (PRINIVIL,ZESTRIL) 20 MG tablet, Take 1 tablet by mouth Daily., Disp: 30 tablet, Rfl: 6  •  meloxicam (MOBIC) 15 MG tablet, Take 1 tablet by mouth Daily with Food., Disp: 90 tablet, Rfl: 1  •  Syringe/Needle, Disp, (BD Eclipse Syringe) 25G X 1\" 3 ML misc, Use 1 syringe 1 (One) Time Per Week with B-12 injection., Disp: 8 each, Rfl: 3    Allergies:   No Known Allergies    Objective     Vital Signs:   Vitals:    07/02/25 1458   BP: 115/84   Pulse: 85   Temp: 97.3 °F (36.3 °C)   TempSrc: Infrared   SpO2: 97%   Weight: 101 kg (222 lb)   Height: 162.6 cm (64\")   PainSc: 0-No pain    Body mass index is 38.11 kg/m².   Pain Score    07/02/25 1458   PainSc: 0-No pain       ECOG Performance Status: 0 - Asymptomatic    Physical Exam:   General: No acute distress. Well appearing   HEENT: Normocephalic, atraumatic. Sclera anicteric.   Neck: supple, no " adenopathy.   Cardiovascular: regular rate and rhythm. No murmurs.   Respiratory: Normal rate. Clear to auscultation bilaterally  Abdomen: Soft, nontender, non distended with normoactive bowel sounds  Lymph: no cervical, supraclavicular or axillary adenopathy  Neuro: Alert and oriented x 3. No focal deficits.   Ext: Symmetric, no swelling.     Laboratory/Imaging Reviewed:   No visits with results within 2 Week(s) from this visit.   Latest known visit with results is:   Lab Requisition on 05/20/2025   Component Date Value Ref Range Status   • Case Report 05/20/2025    Final                    Value:Surgical Pathology Report                         Case: [id number]                                  Authorizing Provider:  Yifan Fontaine MD    Collected:           05/20/2025 01:30 PM          Ordering Location:     [facility]   Received:            05/21/2025 07:25 AM                                 LABORATORY                                                                   Pathologist:           Urbano Perez MD                                                        Specimens:   1) - Breast, Left                                                                                   2) - Breast, Left                                                                         • Clinical Information 05/20/2025    Final                    Value:Intraductal carcinoma in situ of left breast     • Final Diagnosis 05/20/2025    Final                    Value:1.  LEFT BREAST, MEDIAL MARGIN EXCISION:  Ductal carcinoma in situ, intermediate grade, present adjacent to prior lumpectomy changes.  Carcinoma in situ present less than 1 mm from new final margin (see comment).  Breast tissue with benign fibrocystic change.  Calcifications present associated with ductal carcinoma in situ.    2.  LEFT BREAST, POSTERIOR MARGIN:  Ductal carcinoma in situ, intermediate grade, cribriform pattern, present adjacent to prior  "lumpectomy changes.  New margin clear by greater than 5 mm.     • Comment 05/20/2025    Final                    Value:In the medial margin excision there is a focus of atypia characterized by large ducts lined by atypical epithelium.  This is associated with calcifications.  Immunohistochemical staining shows a loss of normal CK5/6 mosaicism in this area with estrogen receptor showing relatively strong and near uniform positivity.  These findings support the presence of atypia and would be consistent with residual ductal carcinoma in situ.  DCIS is present less than 1 mm from the new margin.    Ductal carcinoma in situ is also identified in the extended posterior margin present greater than 5 mm from the new true margin.     • Gross Description 05/20/2025    Final                    Value:1. Breast, Left.  Received in formalin labeled \"left breast medial margin stitch marks new margin\" is a 4.7 x 3.7 x 0.8 cm portion of fibrofatty breast tissue with a stitch designated new margin.  Multiple embedded staples are present on the old margin.  The new margin is inked blue and the old margin is inked black.  Sectioning reveals scattered fat necrosis, but no other gross lesions.  The specimen is submitted entirely and sequentially in blocks 1A-1G.    2. Breast, Left.  Received in formalin labeled \"left breast posterior margin stitch marks new margin\" is a 3.7 x 3.4 x 1.9 cm portion of fibrofatty breast tissue which is oriented by the surgeon with a stitch designated new margin.  Multiple embedded staples are present on the old margin.  The new margin is inked blue and the old margin is inked black.  Sectioning reveals scattered fat necrosis, but no other gross lesions.  The specimen is submitted entirely and sequentially in blocks 2A-2K.  LDP       • Microscopic Description 05/20/2025    Final                    Value:The slides are reviewed and demonstrate histopathologic features supporting the above rendered diagnosis. " "          No results found.    Procedures    Assessment / Plan      Assessment/Plan:   DCIS  I reviewed the patient's history, imaging and pathology as outlined in the HPI.  We reviewed that DCIS represents a non-invasive or \"precancerous\" condition.  She has had surgery to remove the existing DCIS.  Radiation will further reduce her risk of ipsilateral recurrence. We reviewed the role of endocrine therapy to reduce the risk of future malignancy in the ipsilateral and contralateral breast.  There is no evidence that this increases her survival, but would decrease the odds of subsequent malignancy.  We discussed that the alternative would be to forgo endocrine therapy and proceed with close observation with exams and mammography.   We reviewed side effects and schedule of tamoxifen. We specifically reviewed the risk for serious or fatal venous thromboembolism, hot flashes, muscle symptoms, hepatotoxicity, vaginal dryness and cataracts. We discussed the risk for endometrial hyperplasia/malignancies.  She should have annual gynecology exams and seek attention for any abnormal vaginal bleeding.  She should have CMP monitoring.  DVT precautions reviewed.  -She asks about drug interactions with her current medications.  We discussed current Asco guidelines support continuation of stable antidepression medications in patients with cyp 2d6 interaction  - At the end of our conversation, the patient was motivated for adjuvant radiation but declined endocrine therapy prophylaxis.  - I recommend that she follow-up with her breast surgeon for her high risk mammography surveillance.    2. Due for screening  3. Tobacco use  Referral for lung screening CT and colonoscopy    Follow Up:   MARIPOSA Dash MD  Hematology and Oncology   "

## 2025-07-07 ENCOUNTER — HOSPITAL ENCOUNTER (OUTPATIENT)
Dept: RADIATION ONCOLOGY | Facility: HOSPITAL | Age: 50
Setting detail: RADIATION/ONCOLOGY SERIES
Discharge: HOME | End: 2025-07-07
Payer: COMMERCIAL

## 2025-07-07 ENCOUNTER — HOSPITAL ENCOUNTER (OUTPATIENT)
Dept: RADIATION ONCOLOGY | Facility: HOSPITAL | Age: 50
Discharge: HOME | End: 2025-07-07
Payer: COMMERCIAL

## 2025-07-07 DIAGNOSIS — N93.9 ABNORMAL UTERINE BLEEDING (AUB): Primary | ICD-10-CM

## 2025-07-07 LAB — B-HCG UR QL: NEGATIVE

## 2025-07-07 PROCEDURE — 77332 RADIATION TREATMENT AID(S): CPT | Performed by: RADIOLOGY

## 2025-07-07 PROCEDURE — 77290 THER RAD SIMULAJ FIELD CPLX: CPT | Performed by: RADIOLOGY

## 2025-07-07 PROCEDURE — 81025 URINE PREGNANCY TEST: CPT | Performed by: RADIOLOGY

## 2025-07-11 ENCOUNTER — HOSPITAL ENCOUNTER (OUTPATIENT)
Dept: CT IMAGING | Facility: HOSPITAL | Age: 50
Discharge: HOME | End: 2025-07-11
Admitting: INTERNAL MEDICINE
Payer: COMMERCIAL

## 2025-07-11 DIAGNOSIS — Z13.9 DUE FOR SCREENING: ICD-10-CM

## 2025-07-11 DIAGNOSIS — Z72.0 TOBACCO USE: ICD-10-CM

## 2025-07-11 PROCEDURE — 71271 CT THORAX LUNG CANCER SCR C-: CPT

## 2025-07-16 PROCEDURE — 77334 RADIATION TREATMENT AID(S): CPT | Performed by: RADIOLOGY

## 2025-07-16 PROCEDURE — 77295 3-D RADIOTHERAPY PLAN: CPT | Performed by: RADIOLOGY

## 2025-07-16 PROCEDURE — 77300 RADIATION THERAPY DOSE PLAN: CPT | Performed by: RADIOLOGY

## 2025-07-21 ENCOUNTER — HOSPITAL ENCOUNTER (OUTPATIENT)
Dept: RADIATION ONCOLOGY | Facility: HOSPITAL | Age: 50
Discharge: HOME | End: 2025-07-21
Payer: COMMERCIAL

## 2025-07-21 PROCEDURE — 77280 THER RAD SIMULAJ FIELD SMPL: CPT | Performed by: RADIOLOGY

## 2025-07-22 ENCOUNTER — HOSPITAL ENCOUNTER (OUTPATIENT)
Dept: RADIATION ONCOLOGY | Facility: HOSPITAL | Age: 50
Discharge: HOME | End: 2025-07-22

## 2025-07-22 PROCEDURE — 77412 RADIATION TX DELIVERY LVL 3: CPT | Performed by: RADIOLOGY

## 2025-07-22 PROCEDURE — 77387 GUIDANCE FOR RADJ TX DLVR: CPT | Performed by: RADIOLOGY

## 2025-07-23 ENCOUNTER — HOSPITAL ENCOUNTER (OUTPATIENT)
Dept: RADIATION ONCOLOGY | Facility: HOSPITAL | Age: 50
Discharge: HOME | End: 2025-07-23

## 2025-07-23 PROCEDURE — 77412 RADIATION TX DELIVERY LVL 3: CPT | Performed by: RADIOLOGY

## 2025-07-23 PROCEDURE — 77387 GUIDANCE FOR RADJ TX DLVR: CPT | Performed by: RADIOLOGY

## 2025-07-24 ENCOUNTER — HOSPITAL ENCOUNTER (OUTPATIENT)
Dept: RADIATION ONCOLOGY | Facility: HOSPITAL | Age: 50
Discharge: HOME | End: 2025-07-24

## 2025-07-24 PROCEDURE — 77412 RADIATION TX DELIVERY LVL 3: CPT | Performed by: RADIOLOGY

## 2025-07-24 PROCEDURE — 77387 GUIDANCE FOR RADJ TX DLVR: CPT | Performed by: RADIOLOGY

## 2025-07-25 ENCOUNTER — HOSPITAL ENCOUNTER (OUTPATIENT)
Dept: RADIATION ONCOLOGY | Facility: HOSPITAL | Age: 50
Discharge: HOME | End: 2025-07-25

## 2025-07-25 PROCEDURE — 77387 GUIDANCE FOR RADJ TX DLVR: CPT | Performed by: RADIOLOGY

## 2025-07-25 PROCEDURE — 77412 RADIATION TX DELIVERY LVL 3: CPT | Performed by: RADIOLOGY

## 2025-07-25 PROCEDURE — 77336 RADIATION PHYSICS CONSULT: CPT | Performed by: RADIOLOGY

## 2025-07-28 ENCOUNTER — HOSPITAL ENCOUNTER (OUTPATIENT)
Dept: RADIATION ONCOLOGY | Facility: HOSPITAL | Age: 50
Discharge: HOME | End: 2025-07-28
Payer: COMMERCIAL

## 2025-07-28 VITALS — WEIGHT: 217.2 LBS | BODY MASS INDEX: 37.28 KG/M2

## 2025-07-28 PROCEDURE — 77412 RADIATION TX DELIVERY LVL 3: CPT | Performed by: RADIOLOGY

## 2025-07-28 PROCEDURE — 77387 GUIDANCE FOR RADJ TX DLVR: CPT | Performed by: RADIOLOGY

## 2025-07-29 ENCOUNTER — HOSPITAL ENCOUNTER (OUTPATIENT)
Dept: RADIATION ONCOLOGY | Facility: HOSPITAL | Age: 50
Discharge: HOME | End: 2025-07-29

## 2025-07-29 PROCEDURE — 77412 RADIATION TX DELIVERY LVL 3: CPT | Performed by: RADIOLOGY

## 2025-07-29 PROCEDURE — 77387 GUIDANCE FOR RADJ TX DLVR: CPT | Performed by: RADIOLOGY

## 2025-07-30 ENCOUNTER — HOSPITAL ENCOUNTER (OUTPATIENT)
Dept: RADIATION ONCOLOGY | Facility: HOSPITAL | Age: 50
Discharge: HOME | End: 2025-07-30

## 2025-07-30 PROCEDURE — 77412 RADIATION TX DELIVERY LVL 3: CPT | Performed by: RADIOLOGY

## 2025-07-30 PROCEDURE — 77387 GUIDANCE FOR RADJ TX DLVR: CPT | Performed by: RADIOLOGY

## 2025-07-31 ENCOUNTER — HOSPITAL ENCOUNTER (OUTPATIENT)
Dept: RADIATION ONCOLOGY | Facility: HOSPITAL | Age: 50
Discharge: HOME | End: 2025-07-31

## 2025-07-31 PROCEDURE — 77387 GUIDANCE FOR RADJ TX DLVR: CPT | Performed by: RADIOLOGY

## 2025-07-31 PROCEDURE — 77412 RADIATION TX DELIVERY LVL 3: CPT | Performed by: RADIOLOGY

## 2025-07-31 RX ORDER — PEG-3350, SODIUM SULFATE, SODIUM CHLORIDE, POTASSIUM CHLORIDE, SODIUM ASCORBATE AND ASCORBIC ACID 7.5-2.691G
KIT ORAL TAKE AS DIRECTED
Qty: 1 EACH | Refills: 0 | Status: SHIPPED | OUTPATIENT
Start: 2025-07-31

## 2025-08-01 ENCOUNTER — HOSPITAL ENCOUNTER (OUTPATIENT)
Dept: RADIATION ONCOLOGY | Facility: HOSPITAL | Age: 50
Discharge: HOME | End: 2025-08-01

## 2025-08-04 ENCOUNTER — HOSPITAL ENCOUNTER (OUTPATIENT)
Dept: RADIATION ONCOLOGY | Facility: HOSPITAL | Age: 50
Discharge: HOME | End: 2025-08-04
Payer: COMMERCIAL

## 2025-08-04 VITALS — BODY MASS INDEX: 37.11 KG/M2 | WEIGHT: 216.2 LBS

## 2025-08-05 ENCOUNTER — HOSPITAL ENCOUNTER (OUTPATIENT)
Dept: RADIATION ONCOLOGY | Facility: HOSPITAL | Age: 50
Discharge: HOME | End: 2025-08-05

## 2025-08-06 ENCOUNTER — HOSPITAL ENCOUNTER (OUTPATIENT)
Dept: RADIATION ONCOLOGY | Facility: HOSPITAL | Age: 50
Discharge: HOME | End: 2025-08-06

## 2025-08-07 ENCOUNTER — HOSPITAL ENCOUNTER (OUTPATIENT)
Dept: RADIATION ONCOLOGY | Facility: HOSPITAL | Age: 50
Discharge: HOME | End: 2025-08-07

## 2025-08-08 ENCOUNTER — HOSPITAL ENCOUNTER (OUTPATIENT)
Dept: RADIATION ONCOLOGY | Facility: HOSPITAL | Age: 50
Discharge: HOME | End: 2025-08-08

## 2025-08-14 ENCOUNTER — HOSPITAL ENCOUNTER (OUTPATIENT)
Dept: RADIATION ONCOLOGY | Facility: HOSPITAL | Age: 50
Discharge: HOME | End: 2025-08-14

## 2025-08-14 ENCOUNTER — OUTSIDE FACILITY SERVICE (OUTPATIENT)
Dept: GASTROENTEROLOGY | Facility: CLINIC | Age: 50
End: 2025-08-14
Payer: COMMERCIAL

## 2025-08-14 VITALS — BODY MASS INDEX: 36.53 KG/M2 | WEIGHT: 212.8 LBS

## 2025-08-15 ENCOUNTER — HOSPITAL ENCOUNTER (OUTPATIENT)
Dept: RADIATION ONCOLOGY | Facility: HOSPITAL | Age: 50
Discharge: HOME | End: 2025-08-15

## 2025-08-18 ENCOUNTER — HOSPITAL ENCOUNTER (OUTPATIENT)
Dept: RADIATION ONCOLOGY | Facility: HOSPITAL | Age: 50
Discharge: HOME | End: 2025-08-18
Payer: COMMERCIAL

## 2025-08-18 ENCOUNTER — TELEPHONE (OUTPATIENT)
Dept: RADIATION ONCOLOGY | Facility: HOSPITAL | Age: 50
End: 2025-08-18
Payer: COMMERCIAL

## 2025-08-18 VITALS — WEIGHT: 215.2 LBS | BODY MASS INDEX: 36.94 KG/M2

## 2025-08-19 ENCOUNTER — HOSPITAL ENCOUNTER (OUTPATIENT)
Dept: RADIATION ONCOLOGY | Facility: HOSPITAL | Age: 50
Discharge: HOME | End: 2025-08-19

## 2025-08-20 ENCOUNTER — HOSPITAL ENCOUNTER (OUTPATIENT)
Dept: RADIATION ONCOLOGY | Facility: HOSPITAL | Age: 50
Discharge: HOME | End: 2025-08-20

## 2025-08-21 ENCOUNTER — HOSPITAL ENCOUNTER (OUTPATIENT)
Dept: RADIATION ONCOLOGY | Facility: HOSPITAL | Age: 50
Discharge: HOME | End: 2025-08-21